# Patient Record
Sex: FEMALE | Race: WHITE | NOT HISPANIC OR LATINO | URBAN - METROPOLITAN AREA
[De-identification: names, ages, dates, MRNs, and addresses within clinical notes are randomized per-mention and may not be internally consistent; named-entity substitution may affect disease eponyms.]

---

## 2017-05-19 RX ORDER — CYCLOPENTOLATE HYDROCHLORIDE 10 MG/ML
1 SOLUTION/ DROPS OPHTHALMIC
Qty: 0 | Refills: 0 | Status: COMPLETED | OUTPATIENT
Start: 2017-05-22 | End: 2017-05-22

## 2017-05-19 RX ORDER — OFLOXACIN 0.3 %
1 DROPS OPHTHALMIC (EYE)
Qty: 0 | Refills: 0 | Status: COMPLETED | OUTPATIENT
Start: 2017-05-22 | End: 2017-05-22

## 2017-05-19 RX ORDER — TROPICAMIDE 1 %
1 DROPS OPHTHALMIC (EYE)
Qty: 0 | Refills: 0 | Status: COMPLETED | OUTPATIENT
Start: 2017-05-22 | End: 2017-05-22

## 2017-05-19 RX ORDER — PHENYLEPHRINE HCL 2.5 %
1 DROPS OPHTHALMIC (EYE)
Qty: 0 | Refills: 0 | Status: COMPLETED | OUTPATIENT
Start: 2017-05-22 | End: 2017-05-22

## 2017-05-22 ENCOUNTER — OUTPATIENT (OUTPATIENT)
Dept: OUTPATIENT SERVICES | Facility: HOSPITAL | Age: 82
LOS: 1 days | Discharge: HOME | End: 2017-05-22

## 2017-05-22 VITALS
DIASTOLIC BLOOD PRESSURE: 67 MMHG | HEART RATE: 65 BPM | OXYGEN SATURATION: 100 % | SYSTOLIC BLOOD PRESSURE: 144 MMHG | HEIGHT: 69 IN | TEMPERATURE: 98 F | WEIGHT: 122.36 LBS | RESPIRATION RATE: 16 BRPM

## 2017-05-22 VITALS
TEMPERATURE: 97 F | OXYGEN SATURATION: 98 % | DIASTOLIC BLOOD PRESSURE: 49 MMHG | RESPIRATION RATE: 16 BRPM | SYSTOLIC BLOOD PRESSURE: 111 MMHG | HEART RATE: 75 BPM

## 2017-05-22 DIAGNOSIS — H25.12 AGE-RELATED NUCLEAR CATARACT, LEFT EYE: ICD-10-CM

## 2017-05-22 DIAGNOSIS — Z96.653 PRESENCE OF ARTIFICIAL KNEE JOINT, BILATERAL: Chronic | ICD-10-CM

## 2017-05-22 DIAGNOSIS — Z98.890 OTHER SPECIFIED POSTPROCEDURAL STATES: Chronic | ICD-10-CM

## 2017-05-22 RX ORDER — MULTIVIT-MIN/FERROUS GLUCONATE 9 MG/15 ML
1 LIQUID (ML) ORAL
Qty: 0 | Refills: 0 | COMMUNITY

## 2017-05-22 RX ORDER — ACETAMINOPHEN 500 MG
650 TABLET ORAL EVERY 6 HOURS
Qty: 0 | Refills: 0 | Status: DISCONTINUED | OUTPATIENT
Start: 2017-05-22 | End: 2017-06-06

## 2017-05-22 RX ORDER — SIMVASTATIN 20 MG/1
1 TABLET, FILM COATED ORAL
Qty: 0 | Refills: 0 | COMMUNITY

## 2017-05-22 RX ORDER — ACETAMINOPHEN 500 MG
1 TABLET ORAL
Qty: 0 | Refills: 0 | COMMUNITY

## 2017-05-22 RX ADMIN — Medication 1 DROP(S): at 12:57

## 2017-05-22 RX ADMIN — Medication 1 DROP(S): at 13:05

## 2017-05-22 RX ADMIN — Medication 1 DROP(S): at 13:01

## 2017-05-22 RX ADMIN — Medication 1 DROP(S): at 12:58

## 2017-05-22 RX ADMIN — Medication 1 DROP(S): at 13:02

## 2017-05-22 RX ADMIN — CYCLOPENTOLATE HYDROCHLORIDE 1 DROP(S): 10 SOLUTION/ DROPS OPHTHALMIC at 13:01

## 2017-05-22 RX ADMIN — Medication 1 DROP(S): at 13:06

## 2017-05-22 RX ADMIN — CYCLOPENTOLATE HYDROCHLORIDE 1 DROP(S): 10 SOLUTION/ DROPS OPHTHALMIC at 12:58

## 2017-05-22 RX ADMIN — CYCLOPENTOLATE HYDROCHLORIDE 1 DROP(S): 10 SOLUTION/ DROPS OPHTHALMIC at 13:06

## 2017-05-22 NOTE — BRIEF OPERATIVE NOTE - PROCEDURE
Cataract extract, extracaps, phacoemuls, w/ prosth lens insertn, 1 stage  05/22/2017    Active  KRISTOPHER

## 2017-05-22 NOTE — ASU DISCHARGE PLAN (ADULT/PEDIATRIC). - MEDICATION SUMMARY - MEDICATIONS TO TAKE
I will START or STAY ON the medications listed below when I get home from the hospital:    Tylenol 325 mg oral capsule  -- 1 cap(s) by mouth 3 times a day, As Needed  -- Indication: For pmd    simvastatin 10 mg oral tablet  -- 1 tab(s) by mouth once a day (at bedtime)  -- Indication: For pmd    PreserVision oral capsule  -- 1 cap(s) by mouth 2 times a day  -- Indication: For pmd    Centrum Adults oral tablet  -- 1 tab(s) by mouth once a day  -- Indication: For pmd    Citracal Calcium + D Slow Release 1200 oral tablet, extended release  -- 2 tab(s) by mouth once a day (in the morning)  -- Indication: For pmd

## 2017-05-25 DIAGNOSIS — Z96.653 PRESENCE OF ARTIFICIAL KNEE JOINT, BILATERAL: ICD-10-CM

## 2017-05-25 DIAGNOSIS — H25.12 AGE-RELATED NUCLEAR CATARACT, LEFT EYE: ICD-10-CM

## 2017-05-25 DIAGNOSIS — Z88.1 ALLERGY STATUS TO OTHER ANTIBIOTIC AGENTS STATUS: ICD-10-CM

## 2017-05-25 DIAGNOSIS — Z87.891 PERSONAL HISTORY OF NICOTINE DEPENDENCE: ICD-10-CM

## 2017-06-15 ENCOUNTER — APPOINTMENT (OUTPATIENT)
Dept: RHEUMATOLOGY | Facility: CLINIC | Age: 82
End: 2017-06-15

## 2017-06-15 VITALS
WEIGHT: 125 LBS | OXYGEN SATURATION: 96 % | HEART RATE: 82 BPM | TEMPERATURE: 98.7 F | BODY MASS INDEX: 24.54 KG/M2 | DIASTOLIC BLOOD PRESSURE: 70 MMHG | HEIGHT: 60 IN | SYSTOLIC BLOOD PRESSURE: 132 MMHG

## 2017-06-15 PROBLEM — K80.20 CALCULUS OF GALLBLADDER WITHOUT CHOLECYSTITIS WITHOUT OBSTRUCTION: Chronic | Status: ACTIVE | Noted: 2017-05-22

## 2017-06-15 PROBLEM — E78.5 HYPERLIPIDEMIA, UNSPECIFIED: Chronic | Status: ACTIVE | Noted: 2017-05-22

## 2017-06-16 ENCOUNTER — MED ADMIN CHARGE (OUTPATIENT)
Age: 82
End: 2017-06-16

## 2017-06-16 RX ADMIN — DENOSUMAB 0 MG/ML: 60 INJECTION SUBCUTANEOUS at 00:00

## 2017-06-17 RX ORDER — CALCIUM CITRATE/VITAMIN D3 200MG-6.25
TABLET ORAL
Qty: 7 | Refills: 0 | Status: ACTIVE | COMMUNITY
Start: 2017-06-17

## 2017-09-25 ENCOUNTER — APPOINTMENT (OUTPATIENT)
Dept: DERMATOLOGY | Facility: CLINIC | Age: 82
End: 2017-09-25
Payer: MEDICARE

## 2017-09-25 DIAGNOSIS — L82.1 OTHER SEBORRHEIC KERATOSIS: ICD-10-CM

## 2017-09-25 PROCEDURE — 99213 OFFICE O/P EST LOW 20 MIN: CPT | Mod: 25

## 2017-09-25 PROCEDURE — 17000 DESTRUCT PREMALG LESION: CPT

## 2017-12-08 ENCOUNTER — OTHER (OUTPATIENT)
Age: 82
End: 2017-12-08

## 2017-12-08 RX ORDER — DENOSUMAB 60 MG/ML
60 INJECTION SUBCUTANEOUS
Qty: 1 | Refills: 0 | Status: COMPLETED | OUTPATIENT
Start: 2017-06-16

## 2017-12-14 ENCOUNTER — APPOINTMENT (OUTPATIENT)
Dept: RHEUMATOLOGY | Facility: CLINIC | Age: 82
End: 2017-12-14

## 2017-12-19 ENCOUNTER — APPOINTMENT (OUTPATIENT)
Dept: RHEUMATOLOGY | Facility: CLINIC | Age: 82
End: 2017-12-19
Payer: MEDICARE

## 2017-12-19 VITALS
HEIGHT: 60 IN | WEIGHT: 125 LBS | HEART RATE: 77 BPM | BODY MASS INDEX: 24.54 KG/M2 | OXYGEN SATURATION: 97 % | SYSTOLIC BLOOD PRESSURE: 124 MMHG | DIASTOLIC BLOOD PRESSURE: 68 MMHG

## 2017-12-19 PROCEDURE — 96372 THER/PROPH/DIAG INJ SC/IM: CPT

## 2017-12-19 RX ORDER — DENOSUMAB 60 MG/ML
60 INJECTION SUBCUTANEOUS
Qty: 0 | Refills: 0 | Status: COMPLETED | OUTPATIENT
Start: 2017-12-19

## 2017-12-19 RX ADMIN — DENOSUMAB 0 MG/ML: 60 INJECTION SUBCUTANEOUS at 00:00

## 2018-05-02 NOTE — ASU PATIENT PROFILE, ADULT - AS SC BRADEN FRICTION
----- Message from Jen Pop MD sent at 5/2/2018  9:58 AM CDT -----  Mild esophagitis. No evidence of Diaz's. EGD in 5 years.  
Left message for patient to call Dr. Pop's office.   
Patient was informed and verbalized understanding.   
(3) no apparent problem

## 2018-06-28 ENCOUNTER — APPOINTMENT (OUTPATIENT)
Dept: RHEUMATOLOGY | Facility: CLINIC | Age: 83
End: 2018-06-28
Payer: MEDICARE

## 2018-06-28 VITALS
SYSTOLIC BLOOD PRESSURE: 100 MMHG | DIASTOLIC BLOOD PRESSURE: 66 MMHG | HEIGHT: 60 IN | OXYGEN SATURATION: 94 % | BODY MASS INDEX: 25.32 KG/M2 | HEART RATE: 82 BPM | WEIGHT: 129 LBS

## 2018-06-28 PROCEDURE — 99213 OFFICE O/P EST LOW 20 MIN: CPT | Mod: 25

## 2018-06-28 PROCEDURE — 96372 THER/PROPH/DIAG INJ SC/IM: CPT

## 2018-06-28 RX ORDER — DENOSUMAB 60 MG/ML
60 INJECTION SUBCUTANEOUS
Qty: 0 | Refills: 0 | Status: COMPLETED | OUTPATIENT
Start: 2018-06-28

## 2018-06-28 RX ADMIN — DENOSUMAB 0 MG/ML: 60 INJECTION SUBCUTANEOUS at 00:00

## 2019-01-07 ENCOUNTER — APPOINTMENT (OUTPATIENT)
Dept: RHEUMATOLOGY | Facility: CLINIC | Age: 84
End: 2019-01-07

## 2019-01-10 ENCOUNTER — APPOINTMENT (OUTPATIENT)
Dept: RHEUMATOLOGY | Facility: CLINIC | Age: 84
End: 2019-01-10
Payer: MEDICARE

## 2019-01-10 VITALS
DIASTOLIC BLOOD PRESSURE: 70 MMHG | HEART RATE: 97 BPM | OXYGEN SATURATION: 94 % | HEIGHT: 60 IN | WEIGHT: 117 LBS | BODY MASS INDEX: 22.97 KG/M2 | SYSTOLIC BLOOD PRESSURE: 110 MMHG

## 2019-01-10 DIAGNOSIS — G57.02 LESION OF SCIATIC NERVE, LEFT LOWER LIMB: ICD-10-CM

## 2019-01-10 PROCEDURE — 96372 THER/PROPH/DIAG INJ SC/IM: CPT

## 2019-01-10 PROCEDURE — 99213 OFFICE O/P EST LOW 20 MIN: CPT | Mod: 25

## 2019-01-10 RX ORDER — DENOSUMAB 60 MG/ML
60 INJECTION SUBCUTANEOUS
Qty: 1 | Refills: 0 | Status: COMPLETED | OUTPATIENT
Start: 2019-01-10

## 2019-01-10 RX ADMIN — DENOSUMAB 0 MG/ML: 60 INJECTION SUBCUTANEOUS at 00:00

## 2019-01-10 NOTE — REASON FOR VISIT
[Procedure: _________] : a [unfilled] procedure visit [FreeTextEntry1] : Prolia and c/o L mid buttock pain x 2 ys

## 2019-01-10 NOTE — ASSESSMENT
[FreeTextEntry1] : CC:  L mid buttock pain x 2 ys, progressively worse, non radiating most time, occas does radiate to back thigh... no weakness but pain significant w/ activity, can't walk more than 5-10 mins, better w/ rest. NO imaging, seen by ortho suggested MRI > 6 m ago, fear and husbands illness/ death prevented.  No pt\par \par OP:  tolerating Prolia w/out issues q 6 m updated study needed \par NOTE: \par - poor tolerance to oral BP in past - severe GI distress \par \par P/E:  point tenderness L mid buttock area w/ neg SLR, nl str\par A:  likely piriformis tendinopathy or radiculopathy from LS spine given possible description of SS- worse w/ walking \par P:\par - start PT, massage. Call if sx worsen\par - would then recommend imaging and possible injection tx\par - updated Dxa now may be able to switch to REclast

## 2019-01-10 NOTE — PROCEDURE
[Today's Date:] : Date: [unfilled] [Risks] : risks [Benefits] : benefits [Alternatives] : alternatives [Consent Obtained] : written consent was obtained prior to the procedure and is detailed in the patient's record [Patient] : Prior to the start of the procedure a time out was taken and the identity of the patient was confirmed via name and date of birth with the patient. The correct site and the procedure to be performed were confirmed. The correct side was confirmed if applicable. The availability of the correct equipment was verified [Therapeutic] : therapeutic [#1 Site: ______] : #1 site identified in the [unfilled] [Alcohol] : alcohol [de-identified] : Prolia

## 2019-07-19 ENCOUNTER — APPOINTMENT (OUTPATIENT)
Dept: RHEUMATOLOGY | Facility: CLINIC | Age: 84
End: 2019-07-19
Payer: MEDICARE

## 2019-07-19 VITALS
HEIGHT: 60 IN | BODY MASS INDEX: 22.78 KG/M2 | OXYGEN SATURATION: 98 % | SYSTOLIC BLOOD PRESSURE: 136 MMHG | HEART RATE: 78 BPM | WEIGHT: 116 LBS | DIASTOLIC BLOOD PRESSURE: 64 MMHG | RESPIRATION RATE: 16 BRPM

## 2019-07-19 PROCEDURE — 99213 OFFICE O/P EST LOW 20 MIN: CPT | Mod: 25

## 2019-07-19 PROCEDURE — 96372 THER/PROPH/DIAG INJ SC/IM: CPT

## 2019-07-20 ENCOUNTER — MED ADMIN CHARGE (OUTPATIENT)
Age: 84
End: 2019-07-20

## 2019-07-20 RX ADMIN — DENOSUMAB 0 MG/ML: 60 INJECTION SUBCUTANEOUS at 00:00

## 2019-07-20 NOTE — PHYSICAL EXAM
[General Appearance - Alert] : alert [General Appearance - In No Acute Distress] : in no acute distress [General Appearance - Well Nourished] : well nourished [General Appearance - Well Developed] : well developed [General Appearance - Well-Appearing] : healthy appearing [No CVA Tenderness] : no ~M costovertebral angle tenderness [No Spinal Tenderness] : no spinal tenderness [FreeTextEntry1] : fair turgor [Over the Past 2 Weeks, Have You Felt Down, Depressed, or Hopeless?] : 1.) Over the past 2 weeks, have you felt down, depressed, or hopeless? No [Over the Past 2 Weeks, Have You Felt Little Interest or Pleasure Doing Things?] : 2.) Over the past 2 weeks, have you felt little interest or pleasure doing things? No

## 2019-07-20 NOTE — HISTORY OF PRESENT ILLNESS
[FreeTextEntry1] : 7/19/19\par - stable over past 6 months, last Prolia 1/10/19 well tolerated no infections, SE or toxicities\par - No recent fractures, wt stable, not exercising routinely \par - life very stressful...  just under 1 yr, recently son dx w/ pancreatic cancer, multiple family stressors... does have lot family support\par - updated Dexa suggests improvement / stability but still high risk for fractures\par \par 1) OP\par 2) OA\par _________________________________________________________________________\par \par \par (Initial HPI 6/15/17) \par Had been longstanding pt of Dr. Lugo, followed for OA and Osteoporosis, not seen since 2005.  Most recent Dexa suggests persistent OP and need for tx.  \par Also c/o back pain and frustration w/ activity intolerance\par \par 1) Osteoporosis:  Last DXA 9/12/16 w/ T score -2.6 at [facility]  most severe w/ 11% drop since 2009 study and Frax 20% overall and 7.4% risk hip fx.  VFA not done, extensive DJD throughout spine\par -Exercises routinely, adequate (too high) Ca/ Vit d intake (need to see most recent levels, excellent well balanced diet w/ full dairy intake likely > 600-800 mg ca in diet alone \par - reportedly nl) no secondary conditions contributing to significant loss .  \par  + Risk factors:  small stature w/ BMI 24, one glass wine daily\par No previous fragility fractures, no smoking, thyroid dysfunction, parathyroid disease, longstanding anti sz, PPI, SSRI,  FH, or secondary risks such as malignancy/ chemo, celiac dz \par No hx renal calculi or radiation therapy\par Treatment:  \par - last Prolia 10/16 (1st dose)\par - Actonel ys ago severe esophagitis, diarrhea \par \par 2) OA multisite: \par Hands: classic OA changes w/ CMC squaring and intermittent discomfort tolerable \par LS:  w/ confirmed SS w/ pain worse w/ standing, feels better pushing cart- tolerates golf w/ minimal discomfort and intermittent radicular/ facet arthropathy w/ pain localized, stiffness < 10 mins radiating at times into L buttock- sometimes down to L foot.  chronic, at times significant but responds to APAP, nothing stronger.  No previous interventional procedures or recent PT.   Golfs routinely

## 2019-07-20 NOTE — REASON FOR VISIT
[Procedure: _________] : a [unfilled] procedure visit [Follow-Up: _____] : a [unfilled] follow-up visit [FreeTextEntry1] : OA, Osteoporosis

## 2019-07-20 NOTE — ASSESSMENT
[FreeTextEntry1] : 88 yo wf w/ OP and diffuse OA, multisite, hypothyroidism, HLD, \par \par 1) Osteoporosis:  Updated DExa 6/20/19 w/ most severe T score -2.1 and nearly 8% improvement over past study 2016 but Frax remains high with risk for hip fx at 4.7 %... given that she lives alone and has small stature, with advanced OA... still safest to continue Prolia.  \par Was also too stressed about  multiple family issues to make any change at this time.  Will continue Prolia x 2 ys then likely switch to Reclast depending on risk assessment at that time. \par  VFA not done- again unable to perform as no nl vertebra available for comparison. \par -Exercises routinely- in past but past few months struggling 2/2 husbands death and family issues, not consistent  \par Continues w/  adequate  Ca/ Vit d intake updated labs needed now \par  + Risk factors:  small stature w/ BMI 24, one glass wine daily\par No previous fragility fractures,\par No hx renal calculi or radiation therapy\par Treatment:  \par - Prolia 12/17(1st dose)\par \par 2) OA multisite: stable, not active pain at this time.  Overall despite stress doing well\par Hands: classic OA changes w/ CMC squaring and intermittent discomfort tolerable \par LS:  w/ confirmed SS w/ pain worse w/ standing, feels better pushing cart- tolerates golf w/ minimal discomfort and intermittent radicular/ facet arthropathy w/ pain localized, stiffness < 10 mins radiating at times into L buttock- sometimes down to L foot.  chronic, at times significant but responds to APAP, nothing stronger.  No previous interventional procedures or recent PT.  Had been golfing routinely\par Discussion: \par Classic sx of SS tolerable at this point but would benefit from core strengthening to minimize impact and need for pharmacotherapy understood, but given current stressors, nothing formal. .\par \par 3) Stress: multiple issues, tolerated at this point with no complications.  Working closlely with family \par \par Plan:\par - #4 Prolia given today,will continue for now given degree of stress and inability to consider a change at this point.  RIsk for fracture remains high so Prolia remains appropriate.\par \par - Calcium and vit D:  needs updated measure w/ next labs.  Full metabolic w/u should be considered (PTH, SPEP as well- if minimal improvement over next study would suggest 24 hr urine for Ca)\par Ideally 30-45 mins moderate-intense wt bearing/ cardiovascular and strength training for optimal bone health \par \par Taking 3997-2943 mg Ca / 2000 IU vit daily between diet and supplements. Higher doses can cause kidney stones and contribut to atherosclerosis. The average diet contains 600-800 mg Calcium. \par \par - Vit D with goal mnt Vit D between 30-50 for optimal bone health usually requiring 5374-9313 IU daily supplement (hard to get enough in diet). \par Need 15-20 mins sunshine, unprotected daily for optimal Vit d from sun... \par \par - rto 6 months, call ahead to be sure PA done for Prolia.

## 2019-07-20 NOTE — REVIEW OF SYSTEMS
[Arthralgias] : arthralgias [As Noted in HPI] : as noted in HPI [Negative] : Heme/Lymph [FreeTextEntry9] : tolerable [de-identified] : situational stress r/t recent  and family issues  [de-identified] : updated labs needed? current vit D level last measrue 8/18 at 36

## 2019-07-20 NOTE — ASSESSMENT
[FreeTextEntry1] : 86 yo wf w/ OP and diffuse OA, multisite, hypothyroidism, HLD, \par \par 1) Osteoporosis:  Updated DExa 6/20/19 w/ most severe T score -2.1 and nearly 8% improvement over past study 2016 but Frax remains high with risk for hip fx at 4.7 %... given that she lives alone and has small stature, with advanced OA... still safest to continue Prolia.  \par Was also too stressed about  multiple family issues to make any change at this time.  Will continue Prolia x 2 ys then likely switch to Reclast depending on risk assessment at that time. \par  VFA not done- again unable to perform as no nl vertebra available for comparison. \par -Exercises routinely- in past but past few months struggling 2/2 husbands death and family issues, not consistent  \par Continues w/  adequate  Ca/ Vit d intake updated labs needed now \par  + Risk factors:  small stature w/ BMI 24, one glass wine daily\par No previous fragility fractures,\par No hx renal calculi or radiation therapy\par Treatment:  \par - Prolia 12/17(1st dose)\par \par 2) OA multisite: stable, not active pain at this time.  Overall despite stress doing well\par Hands: classic OA changes w/ CMC squaring and intermittent discomfort tolerable \par LS:  w/ confirmed SS w/ pain worse w/ standing, feels better pushing cart- tolerates golf w/ minimal discomfort and intermittent radicular/ facet arthropathy w/ pain localized, stiffness < 10 mins radiating at times into L buttock- sometimes down to L foot.  chronic, at times significant but responds to APAP, nothing stronger.  No previous interventional procedures or recent PT.  Had been golfing routinely\par Discussion: \par Classic sx of SS tolerable at this point but would benefit from core strengthening to minimize impact and need for pharmacotherapy understood, but given current stressors, nothing formal. .\par \par 3) Stress: multiple issues, tolerated at this point with no complications.  Working closlely with family \par \par Plan:\par - #4 Prolia given today,will continue for now given degree of stress and inability to consider a change at this point.  RIsk for fracture remains high so Prolia remains appropriate.\par \par - Calcium and vit D:  needs updated measure w/ next labs.  Full metabolic w/u should be considered (PTH, SPEP as well- if minimal improvement over next study would suggest 24 hr urine for Ca)\par Ideally 30-45 mins moderate-intense wt bearing/ cardiovascular and strength training for optimal bone health \par \par Taking 2684-7457 mg Ca / 2000 IU vit daily between diet and supplements. Higher doses can cause kidney stones and contribut to atherosclerosis. The average diet contains 600-800 mg Calcium. \par \par - Vit D with goal mnt Vit D between 30-50 for optimal bone health usually requiring 3362-6604 IU daily supplement (hard to get enough in diet). \par Need 15-20 mins sunshine, unprotected daily for optimal Vit d from sun... \par \par - rto 6 months, call ahead to be sure PA done for Prolia.

## 2019-07-20 NOTE — PROCEDURE
[Today's Date:] : Date: [unfilled] [Risks] : risks [Benefits] : benefits [Alternatives] : alternatives [Consent Obtained] : written consent was obtained prior to the procedure and is detailed in the patient's record [Patient] : Prior to the start of the procedure a time out was taken and the identity of the patient was confirmed via name and date of birth with the patient. The correct site and the procedure to be performed were confirmed. The correct side was confirmed if applicable. The availability of the correct equipment was verified [#1 Site: ______] : #1 site identified in the [unfilled] [Alcohol] : alcohol [Tolerated Well] : the patient tolerated the procedure well [No Complications] : there were no complications [Instructions Given] : handouts/patient instructions were given to patient [Patient Instructed to Call] : patient was instructed to call if redness at site, a decrease in range of motion or an increase in pain is noted after procedure. [de-identified] : Prolia #4  injection-  [de-identified] : APAP for discomfort, call if any issues  [FreeTextEntry1] : monitor for flulike sx or allergic rxn, take APAP as needed for first 3 days, if flulike sx persist call office\par \par \par

## 2019-07-20 NOTE — REVIEW OF SYSTEMS
[Arthralgias] : arthralgias [As Noted in HPI] : as noted in HPI [Negative] : Heme/Lymph [FreeTextEntry9] : tolerable [de-identified] : situational stress r/t recent  and family issues  [de-identified] : updated labs needed? current vit D level last measrue 8/18 at 36

## 2019-07-20 NOTE — PROCEDURE
[Today's Date:] : Date: [unfilled] [Risks] : risks [Benefits] : benefits [Alternatives] : alternatives [Consent Obtained] : written consent was obtained prior to the procedure and is detailed in the patient's record [Patient] : Prior to the start of the procedure a time out was taken and the identity of the patient was confirmed via name and date of birth with the patient. The correct site and the procedure to be performed were confirmed. The correct side was confirmed if applicable. The availability of the correct equipment was verified [#1 Site: ______] : #1 site identified in the [unfilled] [Alcohol] : alcohol [Tolerated Well] : the patient tolerated the procedure well [No Complications] : there were no complications [Instructions Given] : handouts/patient instructions were given to patient [Patient Instructed to Call] : patient was instructed to call if redness at site, a decrease in range of motion or an increase in pain is noted after procedure. [de-identified] : Prolia #4  injection-  [de-identified] : APAP for discomfort, call if any issues  [FreeTextEntry1] : monitor for flulike sx or allergic rxn, take APAP as needed for first 3 days, if flulike sx persist call office\par \par \par

## 2019-07-23 NOTE — PHYSICAL EXAM
[No CVA Tenderness] : no ~M costovertebral angle tenderness [No Spinal Tenderness] : no spinal tenderness [General Appearance - Alert] : alert [General Appearance - In No Acute Distress] : in no acute distress [General Appearance - Well Nourished] : well nourished [General Appearance - Well Developed] : well developed [General Appearance - Well-Appearing] : healthy appearing [Over the Past 2 Weeks, Have You Felt Down, Depressed, or Hopeless?] : 1.) Over the past 2 weeks, have you felt down, depressed, or hopeless? No [FreeTextEntry1] : fair turgor [Over the Past 2 Weeks, Have You Felt Little Interest or Pleasure Doing Things?] : 2.) Over the past 2 weeks, have you felt little interest or pleasure doing things? No

## 2019-07-23 NOTE — ASSESSMENT
[FreeTextEntry1] : 88 yo wf w/ OP and diffuse OA, multisite, hypothyroidism, HLD, \par \par 1) Osteoporosis:  Updated DExa 6/20/19 w/ most severe T score -2.1 and nearly 8% improvement over past study 2016 but Frax remains high with risk for hip fx at 4.7 %... given that she lives alone and has small stature, with advanced OA... still safest to continue Prolia.  \par Was also too stressed about  multiple family issues to make any change at this time.  Will continue Prolia x 2 ys then likely switch to Reclast depending on risk assessment at that time. \par  VFA not done- again unable to perform as no nl vertebra available for comparison. \par -Exercises routinely- in past but past few months struggling 2/2 husbands death and family issues, not consistent  \par Continues w/  adequate  Ca/ Vit d intake updated labs needed now \par  + Risk factors:  small stature w/ BMI 24, one glass wine daily\par No previous fragility fractures,\par No hx renal calculi or radiation therapy\par Treatment:  \par - Prolia 12/17(1st dose)\par \par 2) OA multisite: stable, not active pain at this time.  Overall despite stress doing well\par Hands: classic OA changes w/ CMC squaring and intermittent discomfort tolerable \par LS:  w/ confirmed SS w/ pain worse w/ standing, feels better pushing cart- tolerates golf w/ minimal discomfort and intermittent radicular/ facet arthropathy w/ pain localized, stiffness < 10 mins radiating at times into L buttock- sometimes down to L foot.  chronic, at times significant but responds to APAP, nothing stronger.  No previous interventional procedures or recent PT.  Had been golfing routinely\par Discussion: \par Classic sx of SS tolerable at this point but would benefit from core strengthening to minimize impact and need for pharmacotherapy understood, but given current stressors, nothing formal. .\par \par 3) Stress: multiple issues, tolerated at this point with no complications.  Working closlely with family \par \par Plan:\par - #4 Prolia given today\par \par - Calcium and vit D:  needs updated measure w/ next labs.  Full metabolic w/u should be considered (PTH, SPEP as well- if minimal improvement over next study would suggest 24 hr urine for Ca)\par Ideally 30-45 mins moderate-intense wt bearing/ cardiovascular and strength training for optimal bone health \par \par Taking 2795-7362 mg Ca / 2000 IU vit daily between diet and supplements. Higher doses can cause kidney stones and contribut to atherosclerosis. The average diet contains 600-800 mg Calcium. \par \par - Vit D with goal mnt Vit D between 30-50 for optimal bone health usually requiring 3983-0254 IU daily supplement (hard to get enough in diet). \par Need 15-20 mins sunshine, unprotected daily for optimal Vit d from sun... \par \par - rto 6 months, call ahead to be sure PA done for Prolia.

## 2019-07-23 NOTE — PROCEDURE
[Today's Date:] : Date: [unfilled] [Risks] : risks [Benefits] : benefits [Alternatives] : alternatives [Consent Obtained] : written consent was obtained prior to the procedure and is detailed in the patient's record [Patient] : Prior to the start of the procedure a time out was taken and the identity of the patient was confirmed via name and date of birth with the patient. The correct site and the procedure to be performed were confirmed. The correct side was confirmed if applicable. The availability of the correct equipment was verified [#1 Site: ______] : #1 site identified in the [unfilled] [Alcohol] : alcohol [Tolerated Well] : the patient tolerated the procedure well [No Complications] : there were no complications [Instructions Given] : handouts/patient instructions were given to patient [Patient Instructed to Call] : patient was instructed to call if redness at site, a decrease in range of motion or an increase in pain is noted after procedure. [de-identified] : Prolia #4  injection-  [FreeTextEntry1] : monitor for flulike sx or allergic rxn, take APAP as needed for first 3 days, if flulike sx persist call office\par \par \par  [de-identified] : APAP for discomfort, call if any issues

## 2019-07-23 NOTE — REVIEW OF SYSTEMS
[Arthralgias] : arthralgias [As Noted in HPI] : as noted in HPI [Negative] : Heme/Lymph [FreeTextEntry9] : tolerable [de-identified] : situational stress r/t recent  and family issues  [de-identified] : updated labs needed? current vit D level last measrue 8/18 at 36

## 2019-12-17 NOTE — PHYSICAL EXAM
[FreeTextEntry3] : Keratotic papule, anterior aspect of the nose, just inferior to the nasal bridge.

## 2019-12-17 NOTE — ASSESSMENT
[FreeTextEntry1] : r/o AK vs. SCC on the nose.\par tx'ed with light curattage.\par Dressed.\par \par Will plan IPL for the face, on the face.

## 2020-01-06 LAB — CORE LAB BIOPSY: NORMAL

## 2020-01-09 ENCOUNTER — APPOINTMENT (OUTPATIENT)
Dept: RHEUMATOLOGY | Facility: CLINIC | Age: 85
End: 2020-01-09
Payer: MEDICARE

## 2020-01-09 VITALS
WEIGHT: 121 LBS | DIASTOLIC BLOOD PRESSURE: 65 MMHG | BODY MASS INDEX: 23.63 KG/M2 | HEART RATE: 85 BPM | OXYGEN SATURATION: 98 % | SYSTOLIC BLOOD PRESSURE: 135 MMHG | TEMPERATURE: 97.9 F

## 2020-01-09 PROCEDURE — 99213 OFFICE O/P EST LOW 20 MIN: CPT

## 2020-01-10 NOTE — REVIEW OF SYSTEMS
[Arthralgias] : arthralgias [As Noted in HPI] : as noted in HPI [Negative] : Heme/Lymph [de-identified] : updated labs needed? current vit D level last measrue 8/18 at 36  [de-identified] : situational stress r/t recent  and family issues  [FreeTextEntry9] : tolerable but also describing neurogenic claucidation.. limits activity

## 2020-01-10 NOTE — PHYSICAL EXAM
[General Appearance - Alert] : alert [General Appearance - In No Acute Distress] : in no acute distress [General Appearance - Well Nourished] : well nourished [General Appearance - Well Developed] : well developed [General Appearance - Well-Appearing] : healthy appearing [No CVA Tenderness] : no ~M costovertebral angle tenderness [No Spinal Tenderness] : no spinal tenderness [] : no respiratory distress [Heart Rate And Rhythm] : heart rate was normal and rhythm regular [Auscultation Breath Sounds / Voice Sounds] : lungs were clear to auscultation bilaterally [Murmurs] : no murmurs [Heart Sounds] : normal S1 and S2 [Heart Sounds Gallop] : no gallops [Heart Sounds Pericardial Friction Rub] : no pericardial rub [Edema] : there was no peripheral edema [FreeTextEntry1] : diffuse paraspinal muscle pain throughout lumbar region- mild [Oriented To Time, Place, And Person] : oriented to person, place, and time [No Focal Deficits] : no focal deficits [Motor Exam] : the motor exam was normal [Affect] : the affect was normal [Impaired Insight] : insight and judgment were intact [Over the Past 2 Weeks, Have You Felt Down, Depressed, or Hopeless?] : 1.) Over the past 2 weeks, have you felt down, depressed, or hopeless? No [Mood] : the mood was normal [Over the Past 2 Weeks, Have You Felt Little Interest or Pleasure Doing Things?] : 2.) Over the past 2 weeks, have you felt little interest or pleasure doing things? No

## 2020-01-10 NOTE — ASSESSMENT
[FreeTextEntry1] : 86 yo wf w/ OP and diffuse OA, multisite, hypothyroidism, HLD, \par \par 1) Osteoporosis:  Updated DExa 6/20/19 w/ most severe T score -2.1 and nearly 8% improvement over past study 2016 but Frax remains high with risk for hip fx at 4.7 %... given that she lives alone and has small stature, with advanced OA... still safest to continue Prolia.  \par Continues to be  under tremendous stress about  multiple family issues to make any change at this time.  Will continue Prolia x 2 ys then likely switch to Reclast depending on risk assessment at that time. \par  VFA not done- again unable to perform as no nl vertebra available for comparison. \par -Exercises routinely- in past but past few months struggling 2/2 husbands death and family issues, not consistent  \par Continues w/  adequate  Ca/ Vit d intake updated labs needed now \par  + Risk factors:  small stature w/ BMI 24, one glass wine daily\par No previous fragility fractures,\par No hx renal calculi or radiation therapy\par Treatment:  \par - Prolia 12/17(1st dose)\par \par 2) OA multisite: stable, not active pain at this time except as noted.  Overall despite stress doing well\par Hands: classic OA changes w/ CMC squaring and intermittent discomfort tolerable \par LS:  w/ confirmed SS w/ pain worse w/ standing, feels better pushing cart- tolerates golf w/ minimal discomfort and intermittent radicular/ facet arthropathy w/ pain localized, stiffness < 10 mins radiating at times into L buttock- sometimes down to L foot.  chronic, at times significant but responds to APAP, nothing stronger.  No previous interventional procedures or recent PT.  Recent increase in sx but no updated imaging... may consider open MRI in future but declined at this time. No overt weakness.. or bowel/ bladder incontinence, advised to call immediately if develops\par Discussion: \par Classic sx of SS tolerable at this point but would benefit from core strengthening to minimize impact and need for pharmacotherapy understood, but given current stressors, nothing formal. .\par \par 3) Stress: multiple issues, tolerated at this point with no complications.  Working closlely with family \par \par Plan:\par - #4 Prolia given 7/19/19... can't give next dose now - 1 wk early, not covered by insurance. Will be traveling next wk and will have to wait 2+ months... advised to mnt wt bearing activity and get updated labs to insure adequate Ca/ vit D levels.  Call if any new fx \par \par - Calcium and vit D:  needs updated measure w/ next labs.  Full metabolic w/u should be considered (PTH, SPEP as well- if minimal improvement over next study would suggest 24 hr urine for Ca)\par Ideally 30-45 mins moderate-intense wt bearing/ cardiovascular and strength training for optimal bone health \par \par Taking 6647-9010 mg Ca / 2000 IU vit daily between diet and supplements. Higher doses can cause kidney stones and contribut to atherosclerosis. The average diet contains 600-800 mg Calcium. \par \par - Vit D with goal mnt Vit D between 30-50 for optimal bone health usually requiring 0059-2932 IU daily supplement (hard to get enough in diet). \par Need 15-20 mins sunshine, unprotected daily for optimal Vit d from sun... \par \par - rto 2 months, call ahead to be sure PA done for Prolia....

## 2020-01-10 NOTE — HISTORY OF PRESENT ILLNESS
[FreeTextEntry1] : 1/9/20\par - stable over past 6 months, last Prolia 7/19/19 well tolerated no infections, SE or toxicities\par - No recent fractures, wt stable, not exercising routinely \par - life still  very stressful...  1 yr and living along- sad,  son dx w/ pancreatic cancer, multiple family stressors... does have lot family support\par - most significant OA c/o low back pain/ leg pain trouble standing for long periods and limited walking 2/2 leg pain... needs MRI LS spine but has been too fearful of MRI\par \par 1) OP\par 2) OA\par _________________________________________________________________________\par \par \par (Initial HPI 6/15/17) \par Had been longstanding pt of Dr. Lugo, followed for OA and Osteoporosis, not seen since 2005.  Most recent Dexa suggests persistent OP and need for tx.  \par Also c/o back pain and frustration w/ activity intolerance\par \par 1) Osteoporosis:  Last DXA 9/12/16 w/ T score -2.6 at LFN  most severe w/ 11% drop since 2009 study and Frax 20% overall and 7.4% risk hip fx.  VFA not done, extensive DJD throughout spine\par -Exercises routinely, adequate (too high) Ca/ Vit d intake (need to see most recent levels, excellent well balanced diet w/ full dairy intake likely > 600-800 mg ca in diet alone \par - reportedly nl) no secondary conditions contributing to significant loss .  \par  + Risk factors:  small stature w/ BMI 24, one glass wine daily\par No previous fragility fractures, no smoking, thyroid dysfunction, parathyroid disease, longstanding anti sz, PPI, SSRI,  FH, or secondary risks such as malignancy/ chemo, celiac dz \par No hx renal calculi or radiation therapy\par Treatment:  \par - last Prolia 10/16 (1st dose)\par - Actonel ys ago severe esophagitis, diarrhea \par \par 2) OA multisite: \par Hands: classic OA changes w/ CMC squaring and intermittent discomfort tolerable \par LS:  w/ confirmed SS w/ pain worse w/ standing, feels better pushing cart- tolerates golf w/ minimal discomfort and intermittent radicular/ facet arthropathy w/ pain localized, stiffness < 10 mins radiating at times into L buttock- sometimes down to L foot.  chronic, at times significant but responds to APAP, nothing stronger.  No previous interventional procedures or recent PT.   Golfs routinely

## 2020-04-16 ENCOUNTER — APPOINTMENT (OUTPATIENT)
Dept: RHEUMATOLOGY | Facility: CLINIC | Age: 85
End: 2020-04-16

## 2020-06-12 ENCOUNTER — APPOINTMENT (OUTPATIENT)
Dept: RHEUMATOLOGY | Facility: CLINIC | Age: 85
End: 2020-06-12
Payer: MEDICARE

## 2020-06-12 VITALS
WEIGHT: 121 LBS | OXYGEN SATURATION: 98 % | SYSTOLIC BLOOD PRESSURE: 120 MMHG | TEMPERATURE: 97.4 F | DIASTOLIC BLOOD PRESSURE: 70 MMHG | BODY MASS INDEX: 23.75 KG/M2 | HEART RATE: 86 BPM | HEIGHT: 60 IN

## 2020-06-12 PROCEDURE — 99213 OFFICE O/P EST LOW 20 MIN: CPT | Mod: 25

## 2020-06-12 PROCEDURE — 96372 THER/PROPH/DIAG INJ SC/IM: CPT

## 2020-06-12 RX ORDER — DENOSUMAB 60 MG/ML
60 INJECTION SUBCUTANEOUS
Qty: 1 | Refills: 0 | Status: COMPLETED | OUTPATIENT
Start: 2020-06-12

## 2020-06-12 RX ORDER — DENOSUMAB 60 MG/ML
60 INJECTION SUBCUTANEOUS
Qty: 1 | Refills: 0 | Status: COMPLETED | OUTPATIENT
Start: 2019-07-20

## 2020-06-12 RX ADMIN — DENOSUMAB 60 MG/ML: 60 INJECTION SUBCUTANEOUS at 00:00

## 2020-06-15 ENCOUNTER — MED ADMIN CHARGE (OUTPATIENT)
Age: 85
End: 2020-06-15

## 2020-06-15 RX ORDER — DENOSUMAB 60 MG/ML
60 INJECTION SUBCUTANEOUS
Qty: 1 | Refills: 0 | Status: COMPLETED | OUTPATIENT
Start: 2020-06-15

## 2020-06-15 RX ADMIN — DENOSUMAB 60 MG/ML: 60 INJECTION SUBCUTANEOUS at 00:00

## 2020-06-15 NOTE — REVIEW OF SYSTEMS
[Arthralgias] : arthralgias [As Noted in HPI] : as noted in HPI [Negative] : Heme/Lymph [de-identified] : situational stress r/t recent  and family issues  [FreeTextEntry9] : tolerable but also describing neurogenic claucidation.. limits activity 20-30 mins walk max  [de-identified] : no obvious weakness  [de-identified] : updated labs needed? current vit D level last measrue 8/18 at 36

## 2020-06-15 NOTE — PHYSICAL EXAM
[General Appearance - Alert] : alert [General Appearance - Well Nourished] : well nourished [General Appearance - Well Developed] : well developed [General Appearance - In No Acute Distress] : in no acute distress [General Appearance - Well-Appearing] : healthy appearing [] : no respiratory distress [Heart Rate And Rhythm] : heart rate was normal and rhythm regular [Heart Sounds] : normal S1 and S2 [Auscultation Breath Sounds / Voice Sounds] : lungs were clear to auscultation bilaterally [Heart Sounds Pericardial Friction Rub] : no pericardial rub [Heart Sounds Gallop] : no gallops [Murmurs] : no murmurs [Edema] : there was no peripheral edema [No CVA Tenderness] : no ~M costovertebral angle tenderness [No Spinal Tenderness] : no spinal tenderness [No Focal Deficits] : no focal deficits [Motor Exam] : the motor exam was normal [Affect] : the affect was normal [Impaired Insight] : insight and judgment were intact [Oriented To Time, Place, And Person] : oriented to person, place, and time [Mood] : the mood was normal [FreeTextEntry1] : 5/5 str throughout [Over the Past 2 Weeks, Have You Felt Down, Depressed, or Hopeless?] : 1.) Over the past 2 weeks, have you felt down, depressed, or hopeless? No [Over the Past 2 Weeks, Have You Felt Little Interest or Pleasure Doing Things?] : 2.) Over the past 2 weeks, have you felt little interest or pleasure doing things? No

## 2020-06-15 NOTE — PROCEDURE
[Today's Date:] : Date: [unfilled] [Risks] : risks [Benefits] : benefits [Consent Obtained] : written consent was obtained prior to the procedure and is detailed in the patient's record [Patient] : Prior to the start of the procedure a time out was taken and the identity of the patient was confirmed via name and date of birth with the patient. The correct site and the procedure to be performed were confirmed. The correct side was confirmed if applicable. The availability of the correct equipment was verified [Therapeutic] : therapeutic [#1 Site: ______] : #1 site identified in the [unfilled] [Alcohol] : alcohol [No Complications] : there were no complications [Instructions Given] : handouts/patient instructions were given to patient [Patient Instructed to Call] : patient was instructed to call if redness at site, a decrease in range of motion or an increase in pain is noted after procedure. [de-identified] : Prolia  [FreeTextEntry1] : monitor for flulike sx or allergic rxn, take APAP as needed for first 3 days, if flulike sx persist call office\par \par \par

## 2020-06-15 NOTE — ASSESSMENT
[FreeTextEntry1] : 89 yo wf , lives alone  w/ OP and diffuse OA, multisite, hypothyroidism, HLD, \par \par 1) Osteoporosis:  Updated DExa 6/20/19 w/ most severe T score -2.1 and nearly 8% improvement over past study 2016 but Frax remains high with risk for hip fx at 4.7 %... given that she lives alone and has small stature, with advanced OA... still safest to continue Prolia.  \par Having missed nearly 1 yr of tx it does make sense to repeat dxa to see if significant change in scores. \par Continues to be  under tremendous stress about  multiple family issues to make any change at this time.  Will continue Prolia x 2 ys then likely switch to Reclast depending on risk assessment at that time. \par  VFA not done- again unable to perform as no nl vertebra available for comparison. \par -Exercises routinely- in past but past few months struggling 2/2 husbands death and family issues, not consistent  \par Continues w/  adequate  Ca/ Vit d intake updated labs needed now \par  + Risk factors:  small stature w/ BMI 24, one glass wine daily\par No previous fragility fractures,\par No hx renal calculi or radiation therapy\par Treatment:  \par - Prolia 12/17(1st dose)\par \par 2) OA multisite: stable, not active pain at this time except as noted regarding significant SS.. .  Overall despite stress doing well\par Hands: classic OA changes w/ CMC squaring and intermittent discomfort tolerable \par LS:  w/ confirmed SS w/ pain worse w/ standing, feels better pushing cart- tolerates golf w/ minimal discomfort and intermittent radicular/ facet arthropathy w/ pain localized, stiffness < 10 mins radiating at times into L buttock- sometimes down to L foot.  chronic, at times significant but responds to APAP, nothing stronger.  No previous interventional procedures or recent PT.  Recent increase in sx but no updated imaging... may consider open MRI in future but declined at this time. No overt weakness.. or bowel/ bladder incontinence, advised to call immediately if develops\par Discussion: \par Classic sx of SS tolerable at this point but would benefit from core strengthening to minimize impact and need for pharmacotherapy understood, but given current stressors, nothing formal. .\par \par 3) Stress: multiple issues, tolerated at this point with no complications.  Working closlely with family \par \par Plan:\par - #4 Prolia given 7/19/19... can't give next dose now - 1 wk early, not covered by insurance- then left town and did not receive dose at all.. now nearly 1 yr- 11 m since last injection. Concerned about rapid loss known to occur when stopped... should repeat dxa to evaluate... \par \par - Given dose today 6/12/20.. updated labs needed, last done 1/20 and ok..continues w/ routine wt bearing activity\par \par - Calcium and vit D:  needs updated measure w/ next labs.  Full metabolic w/u should be considered (PTH, SPEP as well- if minimal improvement over next study would suggest 24 hr urine for Ca)\par Ideally 30-45 mins moderate-intense wt bearing/ cardiovascular and strength training for optimal bone health \par \par Taking 4278-6080 mg Ca / 2000 IU vit daily between diet and supplements. Higher doses can cause kidney stones and contribut to atherosclerosis. The average diet contains 600-800 mg Calcium. \par \par - Vit D with goal mnt Vit D between 30-50 for optimal bone health usually requiring 4531-1409 IU daily supplement (hard to get enough in diet). \par Need 15-20 mins sunshine, unprotected daily for optimal Vit d from sun... \par \par - rto 6months, call ahead to be sure PA done for Prolia.... sooner if needed.  \par \par - call if want to get MRI LS.. or start PT

## 2020-06-15 NOTE — HISTORY OF PRESENT ILLNESS
[FreeTextEntry1] : 6/12/20\par - overall doing well, has been nearly 1 yr since last dose due to logistical issues...  ... last dose July 19  \par - No fractures . \par - tolerating Prolia over ys w/out issues. \par - Updated labs needed, last done 1/20 nl CBC, CMP, vit D 38 w/ nl TSH, PTH \par Son Stevie has pancreatic cancer... \par - life still  very stressful...  1 yr and living along- sad,  strong family support \par - most significant OA c/o low back pain/ leg pain trouble standing for long periods and limited walking 2/2 leg pain... needs MRI LS spine but has been too fearful of MRI\par \par 1) OP\par 2) OA\par _________________________________________________________________________\par \par \par (Initial HPI 6/15/17) \par Had been longstanding pt of Dr. Lugo, followed for OA and Osteoporosis, not seen since 2005.  Most recent Dexa suggests persistent OP and need for tx.  \par Also c/o back pain and frustration w/ activity intolerance\par \par 1) Osteoporosis:  Last DXA 9/12/16 w/ T score -2.6 at LFN  most severe w/ 11% drop since 2009 study and Frax 20% overall and 7.4% risk hip fx.  VFA not done, extensive DJD throughout spine\par -Exercises routinely, adequate (too high) Ca/ Vit d intake (need to see most recent levels, excellent well balanced diet w/ full dairy intake likely > 600-800 mg ca in diet alone \par - reportedly nl) no secondary conditions contributing to significant loss .  \par  + Risk factors:  small stature w/ BMI 24, one glass wine daily\par No previous fragility fractures, no smoking, thyroid dysfunction, parathyroid disease, longstanding anti sz, PPI, SSRI,  FH, or secondary risks such as malignancy/ chemo, celiac dz \par No hx renal calculi or radiation therapy\par Treatment:  \par - last Prolia 10/16 (1st dose)\par - Actonel ys ago severe esophagitis, diarrhea \par \par 2) OA multisite: \par Hands: classic OA changes w/ CMC squaring and intermittent discomfort tolerable \par LS:  w/ confirmed SS w/ pain worse w/ standing, feels better pushing cart- tolerates golf w/ minimal discomfort and intermittent radicular/ facet arthropathy w/ pain localized, stiffness < 10 mins radiating at times into L buttock- sometimes down to L foot.  chronic, at times significant but responds to APAP, nothing stronger.  No previous interventional procedures or recent PT.   Golfs routinely

## 2020-07-06 DIAGNOSIS — L57.0 ACTINIC KERATOSIS: ICD-10-CM

## 2020-07-06 DIAGNOSIS — B35.1 TINEA UNGUIUM: ICD-10-CM

## 2020-07-06 NOTE — PHYSICAL EXAM
[FreeTextEntry3] : Scalp, face, neck, ears, back, chest, UE's examined.\par \par Whitish superficial whitish patch of the bilateral distal great toenails, in a distinctly square patch.\par \par Keratotic papules, left nasal tip x 2, and left malar region.

## 2020-09-10 LAB — FUNGUS SPEC CULT ORG #8: ABNORMAL

## 2020-12-15 ENCOUNTER — APPOINTMENT (OUTPATIENT)
Dept: RHEUMATOLOGY | Facility: CLINIC | Age: 85
End: 2020-12-15
Payer: MEDICARE

## 2020-12-15 VITALS
WEIGHT: 133 LBS | HEART RATE: 88 BPM | DIASTOLIC BLOOD PRESSURE: 80 MMHG | BODY MASS INDEX: 26.81 KG/M2 | HEIGHT: 59 IN | OXYGEN SATURATION: 94 % | TEMPERATURE: 96.5 F | SYSTOLIC BLOOD PRESSURE: 120 MMHG

## 2020-12-15 PROCEDURE — 36415 COLL VENOUS BLD VENIPUNCTURE: CPT

## 2020-12-15 PROCEDURE — 96372 THER/PROPH/DIAG INJ SC/IM: CPT

## 2020-12-15 PROCEDURE — 99213 OFFICE O/P EST LOW 20 MIN: CPT | Mod: 25

## 2020-12-15 RX ORDER — DENOSUMAB 60 MG/ML
60 INJECTION SUBCUTANEOUS
Qty: 1 | Refills: 0 | Status: COMPLETED | OUTPATIENT
Start: 2020-12-15

## 2020-12-15 RX ADMIN — DENOSUMAB 0 MG/ML: 60 INJECTION SUBCUTANEOUS at 00:00

## 2020-12-15 NOTE — ASSESSMENT
[FreeTextEntry1] : 87 yo wf , lives alone  w/ OP and diffuse OA, multisite, hypothyroidism, HLD, \par \par 1) Osteoporosis:  Updated DExa 6/20/19 w/ most severe T score -2.1 and nearly 8% improvement over past study 2016 but Frax remains high with risk for hip fx at 4.7 %... given that she lives alone and has small stature, with advanced OA... still safest to continue Prolia.  \par Having missed nearly 1 yr of tx it does make sense to repeat dxa to see if significant change in scores. \par Continues to be  under tremendous stress about  multiple family issues to make any change at this time.  Will continue Prolia x 2 ys then likely switch to Reclast depending on risk assessment at that time. \par  VFA not done- again unable to perform as no nl vertebra available for comparison. \par -Was Exercising routinely- in past but past few months struggling 2/2 husbands death and family issues, not consistent ... needs to resume, recommended PT and activity tracker..  \par Continues w/  adequate  Ca/ Vit d intake updated labs needed now \par  + Risk factors:  small stature w/ BMI 24, one glass wine daily\par No previous fragility fractures,\par No hx renal calculi or radiation therapy\par Treatment:  \par - Prolia 12/17(1st dose)\par \par 2) OA multisite: stable, not active pain at this time except as noted regarding significant SS.. .  Overall despite stress doing well\par Hands: classic OA changes w/ CMC squaring and intermittent discomfort tolerable \par LS:  w/ confirmed SS w/ pain worse w/ standing, feels better pushing cart- tolerates golf w/ minimal discomfort and intermittent radicular/ facet arthropathy w/ pain localized, stiffness < 10 mins radiating at times into L buttock- sometimes down to L foot.  chronic, but overall not active recently.. at times significant but responds to APAP, nothing stronger.  No previous interventional procedures or recent PT- will start now.. .. No overt weakness.. or bowel/ bladder incontinence, advised to call immediately if develops\par Discussion: \par Classic sx of SS tolerable at this point but would benefit from core strengthening to minimize impact and need for pharmacotherapy understood- now needs to focus on strengthening/ wt loss.. \par \par 3) Stress/ Situational depression: multiple issues, tolerated at this point with no complications.  Working closely with family. Started on lexapro per Dr. Shaver, now sure if effective.. still tearful frequently.. but pandemic isn't helping..  \par \par Plan:\par - Continue q 6 m Prolia.. will reeval after 7/6/21... updated dexa needed \par \par - Given dose today 12/15/20.. .. updated labs needed, last done 1/20 and ok..continues w/ routine wt bearing activity- repeat labs today \par \par - Calcium and vit D:  needs updated measure w/ next labs.  \par \par - Repeat DXA  if minimal improvement over next study would suggest 24 hr urine for Ca)\par Ideally 30-45 mins moderate-intense wt bearing/ cardiovascular and strength training for optimal bone health \par \par Taking 5675-3522 mg Ca / 2000 IU vit daily between diet and supplements. Higher doses can cause kidney stones and contribut to atherosclerosis. The average diet contains 600-800 mg Calcium. \par \par - Vit D with goal mnt Vit D between 30-50 for optimal bone health usually requiring 8392-3413 IU daily supplement (hard to get enough in diet). \par Need 15-20 mins sunshine, unprotected daily for optimal Vit d from sun... \par \par - TEB visit in 2 m to f/u on mood, back / leg pain and activity level, response to PT.. \par \par - rto 6months, call ahead to be sure PA done for Prolia.... sooner if needed.  \par \par - call if want to get MRI LS.

## 2020-12-15 NOTE — PROCEDURE
[Today's Date:] : Date: [unfilled] [Risks] : risks [Benefits] : benefits [Consent Obtained] : written consent was obtained prior to the procedure and is detailed in the patient's record [Patient] : Prior to the start of the procedure a time out was taken and the identity of the patient was confirmed via name and date of birth with the patient. The correct site and the procedure to be performed were confirmed. The correct side was confirmed if applicable. The availability of the correct equipment was verified [Therapeutic] : therapeutic [#1 Site: ______] : #1 site identified in the [unfilled] [Alcohol] : alcohol [No Complications] : there were no complications [Instructions Given] : handouts/patient instructions were given to patient [Patient Instructed to Call] : patient was instructed to call if redness at site, a decrease in range of motion or an increase in pain is noted after procedure. [de-identified] : Prolia  [FreeTextEntry1] : monitor for flulike sx or allergic rxn, take APAP as needed for first 3 days, if flulike sx persist call office\par \par \par

## 2020-12-15 NOTE — PHYSICAL EXAM
[General Appearance - Alert] : alert [General Appearance - In No Acute Distress] : in no acute distress [General Appearance - Well Nourished] : well nourished [General Appearance - Well Developed] : well developed [General Appearance - Well-Appearing] : healthy appearing [] : no respiratory distress [Auscultation Breath Sounds / Voice Sounds] : lungs were clear to auscultation bilaterally [Heart Rate And Rhythm] : heart rate was normal and rhythm regular [Heart Sounds] : normal S1 and S2 [Heart Sounds Gallop] : no gallops [Murmurs] : no murmurs [Heart Sounds Pericardial Friction Rub] : no pericardial rub [Edema] : there was no peripheral edema [No CVA Tenderness] : no ~M costovertebral angle tenderness [No Spinal Tenderness] : no spinal tenderness [Motor Exam] : the motor exam was normal [No Focal Deficits] : no focal deficits [Oriented To Time, Place, And Person] : oriented to person, place, and time [Impaired Insight] : insight and judgment were intact [Affect] : the affect was normal [Mood] : the mood was normal [Over the Past 2 Weeks, Have You Felt Down, Depressed, or Hopeless?] : 1.) Over the past 2 weeks, have you felt down, depressed, or hopeless? No [Over the Past 2 Weeks, Have You Felt Little Interest or Pleasure Doing Things?] : 2.) Over the past 2 weeks, have you felt little interest or pleasure doing things? No [FreeTextEntry1] : admits to situational depression, Sad

## 2020-12-15 NOTE — REVIEW OF SYSTEMS
[Arthralgias] : arthralgias [As Noted in HPI] : as noted in HPI [Negative] : Heme/Lymph [FreeTextEntry9] : tolerable but also describing neurogenic claucidation.. limits activity 20-30 mins walk max  [de-identified] : no obvious weakness  [de-identified] : situational stress r/t recent  and loss son.. leaving grandson 16 yo alone.. very worried - started lexapro 5 mg ? effective [de-identified] : updated labs needed? current vit D level last measrue 8/18 at 36

## 2020-12-15 NOTE — HISTORY OF PRESENT ILLNESS
[FreeTextEntry1] : \par 12/15/20 \par - son  of pancreatic ca  and left 18 yo grandson.. very sad, depressed- life still  very stressful...  2 yr and living along- sad,  strong family support \par - wt gain.. nearly 15 lbs in past 6m.. some activity but admits to minimal activity, upset, hasn't been this heavy in past.  Walks 10-15 mins daily in addition to routine housework.. \par - did fall last summer, no fractures. Updated CXR/ Thoracic spine neg \par - tolerating Prolia over ys w/out issues. \par - Updated labs needed, last done  nl CMP w/ nl Uric Acid.  No recent  vit D 38 w/ nl TSH, PTH \par - most significant OA c/o low back pain/ leg pain trouble standing for long periods and limited walking 2/2 leg pain- though denies recently.. recommended PT ... needs MRI LS spine but has been too fearful of MRI\par \par 1) OP\par 2) OA\par _________________________________________________________________________\par \par \par (Initial HPI 6/15/17) \par Had been longstanding pt of Dr. Lugo, followed for OA and Osteoporosis, not seen since .  Most recent Dexa suggests persistent OP and need for tx.  \par Also c/o back pain and frustration w/ activity intolerance\par \par 1) Osteoporosis:  Last DXA 16 w/ T score -2.6 at LFN  most severe w/ 11% drop since  study and Frax 20% overall and 7.4% risk hip fx.  VFA not done, extensive DJD throughout spine\par -Exercises routinely, adequate (too high) Ca/ Vit d intake (need to see most recent levels, excellent well balanced diet w/ full dairy intake likely > 600-800 mg ca in diet alone \par - reportedly nl) no secondary conditions contributing to significant loss .  \par  + Risk factors:  small stature w/ BMI 24, one glass wine daily\par No previous fragility fractures, no smoking, thyroid dysfunction, parathyroid disease, longstanding anti sz, PPI, SSRI,  FH, or secondary risks such as malignancy/ chemo, celiac dz \par No hx renal calculi or radiation therapy\par Treatment:  \par - last Prolia 10/16 (1st dose)\par - Actonel ys ago severe esophagitis, diarrhea \par \par 2) OA multisite: \par Hands: classic OA changes w/ CMC squaring and intermittent discomfort tolerable \par LS:  w/ confirmed SS w/ pain worse w/ standing, feels better pushing cart- tolerates golf w/ minimal discomfort and intermittent radicular/ facet arthropathy w/ pain localized, stiffness < 10 mins radiating at times into L buttock- sometimes down to L foot.  chronic, at times significant but responds to APAP, nothing stronger.  No previous interventional procedures or recent PT.   Golfs routinely

## 2020-12-17 LAB
25(OH)D3 SERPL-MCNC: 34.1 NG/ML
CALCIUM SERPL-MCNC: 9.6 MG/DL
ERYTHROCYTE [SEDIMENTATION RATE] IN BLOOD BY WESTERGREN METHOD: 14 MM/HR
PARATHYROID HORMONE INTACT: 26 PG/ML
TSH SERPL-ACNC: 3.52 UIU/ML

## 2020-12-19 LAB — SARS-COV-2 N GENE NPH QL NAA+PROBE: NOT DETECTED

## 2021-01-29 ENCOUNTER — APPOINTMENT (OUTPATIENT)
Dept: RHEUMATOLOGY | Facility: CLINIC | Age: 86
End: 2021-01-29

## 2021-02-26 LAB — SARS-COV-2 N GENE NPH QL NAA+PROBE: NOT DETECTED

## 2021-06-17 ENCOUNTER — APPOINTMENT (OUTPATIENT)
Dept: RHEUMATOLOGY | Facility: CLINIC | Age: 86
End: 2021-06-17
Payer: MEDICARE

## 2021-06-17 VITALS
TEMPERATURE: 97.6 F | DIASTOLIC BLOOD PRESSURE: 72 MMHG | BODY MASS INDEX: 27.27 KG/M2 | OXYGEN SATURATION: 96 % | WEIGHT: 135 LBS | HEART RATE: 76 BPM | SYSTOLIC BLOOD PRESSURE: 128 MMHG

## 2021-06-17 PROCEDURE — 96372 THER/PROPH/DIAG INJ SC/IM: CPT

## 2021-06-17 PROCEDURE — 99214 OFFICE O/P EST MOD 30 MIN: CPT | Mod: 25

## 2021-06-17 RX ORDER — DENOSUMAB 60 MG/ML
60 INJECTION SUBCUTANEOUS
Qty: 1 | Refills: 0 | Status: COMPLETED | OUTPATIENT
Start: 2021-06-17

## 2021-06-17 RX ADMIN — DENOSUMAB 0 MG/ML: 60 INJECTION SUBCUTANEOUS at 00:00

## 2021-06-17 NOTE — REVIEW OF SYSTEMS
[Arthralgias] : arthralgias [As Noted in HPI] : as noted in HPI [Negative] : Heme/Lymph [FreeTextEntry9] : tolerable but also describing neurogenic claucidation.. limits activity 20-30 mins walk max  [de-identified] : no obvious weakness  [de-identified] : situational stress r/t recent  and loss son.. leaving grandson 16 yo alone.. very worried - started lexapro 5 mg ? effective [de-identified] : updated labs needed? current vit D level last measrue 8/18 at 36

## 2021-06-17 NOTE — PHYSICAL EXAM
[General Appearance - Alert] : alert [General Appearance - In No Acute Distress] : in no acute distress [General Appearance - Well Developed] : well developed [General Appearance - Well Nourished] : well nourished [General Appearance - Well-Appearing] : healthy appearing [] : no respiratory distress [Auscultation Breath Sounds / Voice Sounds] : lungs were clear to auscultation bilaterally [Heart Rate And Rhythm] : heart rate was normal and rhythm regular [Heart Sounds] : normal S1 and S2 [Heart Sounds Gallop] : no gallops [Murmurs] : no murmurs [Heart Sounds Pericardial Friction Rub] : no pericardial rub [Edema] : there was no peripheral edema [No CVA Tenderness] : no ~M costovertebral angle tenderness [No Spinal Tenderness] : no spinal tenderness [Motor Exam] : the motor exam was normal [No Focal Deficits] : no focal deficits [Oriented To Time, Place, And Person] : oriented to person, place, and time [Impaired Insight] : insight and judgment were intact [Affect] : the affect was normal [Mood] : the mood was normal [Over the Past 2 Weeks, Have You Felt Down, Depressed, or Hopeless?] : 1.) Over the past 2 weeks, have you felt down, depressed, or hopeless? No [Over the Past 2 Weeks, Have You Felt Little Interest or Pleasure Doing Things?] : 2.) Over the past 2 weeks, have you felt little interest or pleasure doing things? No [FreeTextEntry1] : admits to situational depression, Sad

## 2021-06-17 NOTE — PROCEDURE
[Today's Date:] : Date: [unfilled] [Risks] : risks [Benefits] : benefits [Consent Obtained] : written consent was obtained prior to the procedure and is detailed in the patient's record [Patient] : Prior to the start of the procedure a time out was taken and the identity of the patient was confirmed via name and date of birth with the patient. The correct site and the procedure to be performed were confirmed. The correct side was confirmed if applicable. The availability of the correct equipment was verified [Therapeutic] : therapeutic [#1 Site: ______] : #1 site identified in the [unfilled] [Alcohol] : alcohol [No Complications] : there were no complications [Instructions Given] : handouts/patient instructions were given to patient [Patient Instructed to Call] : patient was instructed to call if redness at site, a decrease in range of motion or an increase in pain is noted after procedure. [de-identified] : Prolia  [FreeTextEntry1] : monitor for flulike sx or allergic rxn, take APAP as needed for first 3 days, if flulike sx persist call office\par \par \par

## 2021-06-17 NOTE — HISTORY OF PRESENT ILLNESS
[FreeTextEntry1] : 21 \par - son  of pancreatic ca  and left 18 yo grandson continues to struggle with reactive depression, on escitalopram 5 mg but admits to persistent depression, lack motivation, energy, struggling to find stewart.. isolating self more than would like to.. still responding to loss of  as well from 2019.  \par - Minimal physical activity, no routine exercise, plays golf once/ wk but no walking or other activity beyond caring for self living alone \par - wt gain nearly 15 lbs in past ys, stable past several months.. but frustrated though admits not doing anything about it\par - recently notes CABRERA even with mild activity.. and occas palpitations "feels heart racing with activity"..denies cp/ dizziness with this.. son suggested she get this evaluated, hasn't called PCP.. \par - Spinal stenosis well documented, no c/o discomfort from it but admits not active... declined PT in past and hasn’t' had MR- too fearful \par - tolerating Prolia over ys w/out issues. \par - Updated labs needed, last done  nl CMP w/ nl Uric Acid.  No recent  vit D 38 w/ nl TSH, PTH \par \par 1) OP\par 2) OA\par 3) Depression, reactive\par 4) Palpitations\par _________________________________________________________________________\par \par \par (Initial HPI 6/15/17) \par Had been longstanding pt of Dr. Lugo, followed for OA and Osteoporosis, not seen since 2005.  Most recent Dexa suggests persistent OP and need for tx.  \par Also c/o back pain and frustration w/ activity intolerance\par \par 1) Osteoporosis:  Last DXA 16 w/ T score -2.6 at [facility]  most severe w/ 11% drop since  study and Frax 20% overall and 7.4% risk hip fx.  VFA not done, extensive DJD throughout spine\par -Exercises routinely, adequate (too high) Ca/ Vit d intake (need to see most recent levels, excellent well balanced diet w/ full dairy intake likely > 600-800 mg ca in diet alone \par - reportedly nl) no secondary conditions contributing to significant loss .  \par  + Risk factors:  small stature w/ BMI 24, one glass wine daily\par No previous fragility fractures, no smoking, thyroid dysfunction, parathyroid disease, longstanding anti sz, PPI, SSRI,  FH, or secondary risks such as malignancy/ chemo, celiac dz \par No hx renal calculi or radiation therapy\par Treatment:  \par - last Prolia 10/16 (1st dose)\par - Actonel ys ago severe esophagitis, diarrhea \par \par 2) OA multisite: \par Hands: classic OA changes w/ CMC squaring and intermittent discomfort tolerable \par LS:  w/ confirmed SS w/ pain worse w/ standing, feels better pushing cart- tolerates golf w/ minimal discomfort and intermittent radicular/ facet arthropathy w/ pain localized, stiffness < 10 mins radiating at times into L buttock- sometimes down to L foot.  chronic, at times significant but responds to APAP, nothing stronger.  No previous interventional procedures or recent PT.   Golfs routinely

## 2021-06-17 NOTE — ASSESSMENT
[FreeTextEntry1] : 89 yo wf , lives alone  w/ OP and diffuse OA, multisite, hypothyroidism, HLD, and new onset palpitations (6/21) \par \par 1) Osteoporosis:  Updated DExa 6/20/19 w/ most severe T score -2.1 and nearly 8% improvement over past study 2016 but Frax remains high with risk for hip fx at 4.7 %... given that she lives alone and has small stature, with advanced OA... still safest to continue Prolia.  \par Continues to be  under tremendous stress about  multiple family issues to make any change at this time.  Will continue Prolia x 2 ys then likely switch to Reclast depending on risk assessment at that time. \par  VFA not done- again unable to perform as no nl vertebra available for comparison. \par -Was Exercising routinely- in past but past few months struggling 2/2 husbands death followed soon after by son's death.. and hasn't resumed anything (see below)\par Continues w/  adequate  Ca/ Vit d intake updated labs needed now \par  + Risk factors:  small stature w/ BMI 24, one glass wine daily\par No previous fragility fractures,\par No hx renal calculi or radiation therapy\par Treatment:  \par - Prolia 12/17(1st dose)\par \par 2) OA multisite: stable, not active pain at this time except as noted regarding significant SS.. .  Overall despite stress doing well but NOT active.  Needs to increase activity but struggling with motivation (see below) and now new onset palpitations \par Hands: classic OA changes w/ CMC squaring and intermittent discomfort tolerable \par LS:  w/ confirmed SS w/ pain worse w/ standing, feels better pushing cart- tolerates golf w/ minimal discomfort and intermittent radicular/ facet arthropathy w/ pain localized, stiffness < 10 mins radiating at times into L buttock- sometimes down to L foot.  chronic, but overall not active recently.. at times significant but responds to APAP, nothing stronger.  No previous interventional procedures or recent PT- will start now.. .. No overt weakness.. or bowel/ bladder incontinence, advised to call immediately if develops\par Discussion: \par Classic sx of SS tolerable at this point but would benefit from core strengthening to minimize impact and need for pharmacotherapy understood- now needs to focus on strengthening/ wt loss.. \par \par 3) Stress/ Situational depression: multiple issues, tolerated at this point with no complications.  Working closely with family. Started on lexapro per Dr. Gilmore, now sure if effective at 5 mg for past y, recommend increasing to 10 mg (well tolerated).. still tearful frequently.  Called to d/w Dr. Gilmore \par \par 4) Palpitations: new onset w/ associated CABRERA.. needs w/u, called to d/w Dr. Gilmore and encouraged to call Dr. Andrews's office for consultation as well.. \par \par Plan:\par - Continue q 6 m Prolia.. will reeval after 7/6/21... updated dexa needed \par \par - Calcium and vit D:  needs updated measure w/ next labs.  \par \par - Repeat DXA  if minimal improvement over next study would suggest 24 hr urine for Ca)\par Ideally 30-45 mins moderate-intense wt bearing/ cardiovascular and strength training for optimal bone health \par \par Taking 4495-3865 mg Ca / 2000 IU vit daily between diet and supplements. Higher doses can cause kidney stones and contribut to atherosclerosis. The average diet contains 600-800 mg Calcium. \par \par - Vit D with goal mnt Vit D between 30-50 for optimal bone health usually requiring 6312-1559 IU daily supplement (hard to get enough in diet). \par Need 15-20 mins sunshine, unprotected daily for optimal Vit d from sun... \par \par - needs to f/u with PCP, cardiologist re:  CABRERA/ palpitations, called to d/w Dr. Gilmore\par \par - increase lexapro to 10 mg now  and may need to consider counseling given degree of major losses in past few ys \par \par - rto 3 m for f/u on issues above and 6 months for PRolia\par \par -  call ahead to be sure PA done for Prolia.... sooner if needed.  \par \par - call if want to get MRI LS or start PT..

## 2021-10-05 ENCOUNTER — APPOINTMENT (OUTPATIENT)
Dept: RHEUMATOLOGY | Facility: CLINIC | Age: 86
End: 2021-10-05
Payer: MEDICARE

## 2021-10-05 ENCOUNTER — RESULT REVIEW (OUTPATIENT)
Age: 86
End: 2021-10-05

## 2021-10-05 VITALS
HEART RATE: 68 BPM | OXYGEN SATURATION: 98 % | TEMPERATURE: 97.9 F | SYSTOLIC BLOOD PRESSURE: 122 MMHG | WEIGHT: 133 LBS | DIASTOLIC BLOOD PRESSURE: 70 MMHG | BODY MASS INDEX: 26.86 KG/M2

## 2021-10-05 DIAGNOSIS — F43.21 ADJUSTMENT DISORDER WITH DEPRESSED MOOD: ICD-10-CM

## 2021-10-05 DIAGNOSIS — F43.9 REACTION TO SEVERE STRESS, UNSPECIFIED: ICD-10-CM

## 2021-10-05 PROCEDURE — 99213 OFFICE O/P EST LOW 20 MIN: CPT | Mod: 25

## 2021-10-05 PROCEDURE — G0008: CPT

## 2021-10-05 PROCEDURE — 90662 IIV NO PRSV INCREASED AG IM: CPT

## 2021-10-05 NOTE — CONSULT LETTER
[Dear  ___] : Dear  [unfilled], [Courtesy Letter:] : I had the pleasure of seeing your patient, [unfilled], in my office today. [Consult Closing:] : Thank you very much for allowing me to participate in the care of this patient.  If you have any questions, please do not hesitate to contact me. [Sincerely,] : Sincerely, [FreeTextEntry2] : Luis Fernando Shaver MD  [FreeTextEntry1] : Please see below for concerns regarding short term memory problems- NEW...\par \par 89 yo wf , lives alone  w/ OP and diffuse OA, multisite, hypothyroidism, HLD\par Presents today, not sure why here.. too early for Prolia and issues throughout w/ memory asking same question several times, \par \par 1) Osteoporosis:  Updated DExa 6/20/19 w/ most severe T score -2.1 and nearly 8% improvement over past study 2016 but Frax remains high with risk for hip fx at 4.7 %... given that she lives alone and has small stature, with advanced OA... still safest to continue Prolia.  \par Having missed nearly 1 yr of tx it does make sense to repeat dxa to see if significant change in scores. \par Continues to be  under tremendous stress about  multiple family issues to make any change at this time.  Will continue Prolia x 2 ys then likely switch to Reclast depending on risk assessment at that time. \par  VFA not done- again unable to perform as no nl vertebra available for comparison. \par -Was Exercising routinely- in past but past few months struggling 2/2 husbands death and family issues, not consistent ... needs to resume, recommended PT and activity tracker..  \par Continues w/  adequate  Ca/ Vit d intake updated labs needed now \par  + Risk factors:  small stature w/ BMI 24, one glass wine daily\par No previous fragility fractures,\par No hx renal calculi or radiation therapy\par Treatment:  \par - Prolia 12/17(1st dose)\par \par 2) OA multisite: stable, not active pain at this time except as noted regarding significant SS.. .  Overall despite stress doing well\par Hands: classic OA changes w/ CMC squaring and intermittent discomfort tolerable \par LS:  w/ confirmed SS w/ pain worse w/ standing, feels better pushing cart- tolerates golf w/ minimal discomfort and intermittent radicular/ facet arthropathy w/ pain localized, stiffness < 10 mins radiating at times into L buttock- sometimes down to L foot.  chronic, but overall not active recently.. at times significant but responds to APAP, nothing stronger.  No previous interventional procedures or recent PT- will start now.. .. No overt weakness.. or bowel/ bladder incontinence, advised to call immediately if develops\par Discussion: \par Classic sx of SS tolerable at this point but would benefit from core strengthening to minimize impact and need for pharmacotherapy understood- now needs to focus on strengthening/ wt loss.. \par \par 3) Stress/ Situational depression and noted NEW Short term memory isssues: multiple issues, tolerated at this point with no complications... till now c/o memory issues.  \par   Working closely with family. Started on lexapro per Dr. Shaver, now sure if effective but didn't continue ... \par \par Plan:\par - Continue q 6 m Prolia.. next dose 12/17/21 but ... updated dexa needed \par \par - Given fluzone HD today  ..\par \par - Calcium and vit D:  needs updated measure w/ next labs.  \par \par - Repeat DXA  if minimal improvement over next study would suggest 24 hr urine for Ca)\par Ideally 30-45 mins moderate-intense wt bearing/ cardiovascular and strength training for optimal bone health \par \par Taking 2805-2643 mg Ca / 2000 IU vit daily between diet and supplements. Higher doses can cause kidney stones and contribut to atherosclerosis. The average diet contains 600-800 mg Calcium. \par \par - Vit D with goal mnt Vit D between 30-50 for optimal bone health usually requiring 4314-8751 IU daily supplement (hard to get enough in diet). \par Need 15-20 mins sunshine, unprotected daily for optimal Vit d from sun... \par \par - rto 3 m for Prolia.\par \par - call to son to discuss concerns about memory, should also f/u with Dr. Sahver\par \par - call if want to get MRI LS...  [FreeTextEntry3] : Geno Jones DNP, ANP-C\par Division or Rheumatology\par [facility]\par \par

## 2021-10-05 NOTE — REVIEW OF SYSTEMS
[Arthralgias] : arthralgias [As Noted in HPI] : as noted in HPI [Negative] : Heme/Lymph [FreeTextEntry9] : tolerable but also describing neurogenic claudication.. limits activity 20-30 mins walk max  [de-identified] : no obvious weakness  [de-identified] : situational stress r/t recent  and loss son... very worried - started lexapro 5 mg not continued..  [de-identified] : updated labs needed? current vit D level last measrue 8/18 at 36

## 2021-10-05 NOTE — ASSESSMENT
[FreeTextEntry1] : 88 yo wf , lives alone  w/ OP and diffuse OA, multisite, hypothyroidism, HLD\par Presents today, not sure why here.. too early for Prolia and issues throughout w/ memory asking same question several times, \par \par 1) Osteoporosis:  Updated DExa 6/20/19 w/ most severe T score -2.1 and nearly 8% improvement over past study 2016 but Frax remains high with risk for hip fx at 4.7 %... given that she lives alone and has small stature, with advanced OA... still safest to continue Prolia.  \par Having missed nearly 1 yr of tx it does make sense to repeat dxa to see if significant change in scores. \par Continues to be  under tremendous stress about  multiple family issues to make any change at this time.  Will continue Prolia x 2 ys then likely switch to Reclast depending on risk assessment at that time. \par  VFA not done- again unable to perform as no nl vertebra available for comparison. \par -Was Exercising routinely- in past but past few months struggling 2/2 husbands death and family issues, not consistent ... needs to resume, recommended PT and activity tracker..  \par Continues w/  adequate  Ca/ Vit d intake updated labs needed now \par  + Risk factors:  small stature w/ BMI 24, one glass wine daily\par No previous fragility fractures,\par No hx renal calculi or radiation therapy\par Treatment:  \par - Prolia 12/17(1st dose)\par \par 2) OA multisite: stable, not active pain at this time except as noted regarding significant SS.. .  Overall despite stress doing well\par Hands: classic OA changes w/ CMC squaring and intermittent discomfort tolerable \par LS:  w/ confirmed SS w/ pain worse w/ standing, feels better pushing cart- tolerates golf w/ minimal discomfort and intermittent radicular/ facet arthropathy w/ pain localized, stiffness < 10 mins radiating at times into L buttock- sometimes down to L foot.  chronic, but overall not active recently.. at times significant but responds to APAP, nothing stronger.  No previous interventional procedures or recent PT- will start now.. .. No overt weakness.. or bowel/ bladder incontinence, advised to call immediately if develops\par Discussion: \par Classic sx of SS tolerable at this point but would benefit from core strengthening to minimize impact and need for pharmacotherapy understood- now needs to focus on strengthening/ wt loss.. \par \par 3) Stress/ Situational depression and noted NEW Short term memory isssues: multiple issues, tolerated at this point with no complications... till now c/o memory issues.  \par   Working closely with family. Started on lexapro per Dr. Shaver, now sure if effective but didn't continue ... \par \par Plan:\par - Continue q 6 m Prolia.. next dose 12/17/21 but ... updated dexa needed \par \par - Given fluzone HD today  ..\par \par - Calcium and vit D:  needs updated measure w/ next labs.  \par \par - Repeat DXA  if minimal improvement over next study would suggest 24 hr urine for Ca)\par Ideally 30-45 mins moderate-intense wt bearing/ cardiovascular and strength training for optimal bone health \par \par Taking 8864-1804 mg Ca / 2000 IU vit daily between diet and supplements. Higher doses can cause kidney stones and contribut to atherosclerosis. The average diet contains 600-800 mg Calcium. \par \par - Vit D with goal mnt Vit D between 30-50 for optimal bone health usually requiring 3962-8891 IU daily supplement (hard to get enough in diet). \par Need 15-20 mins sunshine, unprotected daily for optimal Vit d from sun... \par \par - rto 3 m for Prolia.\par \par - call to son to discuss concerns about memory, should also f/u with Dr. Shaver\par \par - call if want to get MRI LS...

## 2021-10-05 NOTE — HISTORY OF PRESENT ILLNESS
[FreeTextEntry1] : 10/5/21\par - not clear why she had appointment today, is not due for Prolia till 12/17/21.. \par - was supposed to get updated Dexa, not done \par - Asking about booster vacccine for COVID otherwise reports feeling well\par - Admits to short term memory issues.. increased lately... still struggling with depression following loss of  and son in past few ys and now nephew dying of liver failure too...Grandson off to College.. describes supportive family \par - Updated labs needed, last done 9/20 nl CMP w/ nl Uric Acid.  No recent  vit D 38 w/ nl TSH, PTH again ordered  \par - most significant OA but not complaining of pain at this time..  c/o low back pain/ leg pain trouble standing for long periods and limited walking 2/2 leg pain- though denies recently.. recommended PT ... needs MRI LS spine but has been too fearful of MRI\par \par 1) OP\par 2) OA\par _________________________________________________________________________\par \par \par (Initial HPI 6/15/17) \par Had been longstanding pt of Dr. Lugo, followed for OA and Osteoporosis, not seen since 2005.  Most recent Dexa suggests persistent OP and need for tx.  \par Also c/o back pain and frustration w/ activity intolerance\par \par 1) Osteoporosis:  Last DXA 9/12/16 w/ T score -2.6 at L[facility]  most severe w/ 11% drop since 2009 study and Frax 20% overall and 7.4% risk hip fx.  VFA not done, extensive DJD throughout spine\par -Exercises routinely, adequate (too high) Ca/ Vit d intake (need to see most recent levels, excellent well balanced diet w/ full dairy intake likely > 600-800 mg ca in diet alone \par - reportedly nl) no secondary conditions contributing to significant loss .  \par  + Risk factors:  small stature w/ BMI 24, one glass wine daily\par No previous fragility fractures, no smoking, thyroid dysfunction, parathyroid disease, longstanding anti sz, PPI, SSRI,  FH, or secondary risks such as malignancy/ chemo, celiac dz \par No hx renal calculi or radiation therapy\par Treatment:  \par - last Prolia 10/16 (1st dose)\par - Actonel ys ago severe esophagitis, diarrhea \par \par 2) OA multisite: \par Hands: classic OA changes w/ CMC squaring and intermittent discomfort tolerable \par LS:  w/ confirmed SS w/ pain worse w/ standing, feels better pushing cart- tolerates golf w/ minimal discomfort and intermittent radicular/ facet arthropathy w/ pain localized, stiffness < 10 mins radiating at times into L buttock- sometimes down to L foot.  chronic, at times significant but responds to APAP, nothing stronger.  No previous interventional procedures or recent PT.   Golfs routinely

## 2021-10-05 NOTE — PROCEDURE
[Today's Date:] : Date: [unfilled] [Risks] : risks [Benefits] : benefits [Consent Obtained] : written consent was obtained prior to the procedure and is detailed in the patient's record [Patient] : Prior to the start of the procedure a time out was taken and the identity of the patient was confirmed via name and date of birth with the patient. The correct site and the procedure to be performed were confirmed. The correct side was confirmed if applicable. The availability of the correct equipment was verified [Therapeutic] : therapeutic [#1 Site: ______] : #1 site identified in the [unfilled] [Alcohol] : alcohol [No Complications] : there were no complications [Instructions Given] : handouts/patient instructions were given to patient [Patient Instructed to Call] : patient was instructed to call if redness at site, a decrease in range of motion or an increase in pain is noted after procedure. [25 gauge 5/8  inch] : A 25 gauge 5/8 inch needle was used [de-identified] : Fluzone HD  [FreeTextEntry1] : monitor for flulike sx or allergic rxn, take APAP as needed for first 3 days, if flulike sx persist call office\par \par \par

## 2021-10-05 NOTE — PHYSICAL EXAM
[General Appearance - Alert] : alert [General Appearance - In No Acute Distress] : in no acute distress [General Appearance - Well Nourished] : well nourished [General Appearance - Well Developed] : well developed [General Appearance - Well-Appearing] : healthy appearing [] : no respiratory distress [Auscultation Breath Sounds / Voice Sounds] : lungs were clear to auscultation bilaterally [Heart Rate And Rhythm] : heart rate was normal and rhythm regular [Heart Sounds] : normal S1 and S2 [Heart Sounds Gallop] : no gallops [Murmurs] : no murmurs [Heart Sounds Pericardial Friction Rub] : no pericardial rub [Edema] : there was no peripheral edema [No CVA Tenderness] : no ~M costovertebral angle tenderness [No Spinal Tenderness] : no spinal tenderness [Motor Exam] : the motor exam was normal [No Focal Deficits] : no focal deficits [Oriented To Time, Place, And Person] : oriented to person, place, and time [Impaired Insight] : insight and judgment were intact [Affect] : the affect was normal [Mood] : the mood was normal [Over the Past 2 Weeks, Have You Felt Down, Depressed, or Hopeless?] : 1.) Over the past 2 weeks, have you felt down, depressed, or hopeless? No [Over the Past 2 Weeks, Have You Felt Little Interest or Pleasure Doing Things?] : 2.) Over the past 2 weeks, have you felt little interest or pleasure doing things? No [FreeTextEntry1] : admits to situational depression, Sad- short term memory issues noted- asked same questions several times even after writing it down..

## 2021-10-08 ENCOUNTER — APPOINTMENT (OUTPATIENT)
Dept: RADIOLOGY | Facility: CLINIC | Age: 86
End: 2021-10-08
Payer: MEDICARE

## 2021-10-08 ENCOUNTER — OUTPATIENT (OUTPATIENT)
Dept: OUTPATIENT SERVICES | Facility: HOSPITAL | Age: 86
LOS: 1 days | End: 2021-10-08
Payer: MEDICARE

## 2021-10-08 DIAGNOSIS — M81.0 AGE-RELATED OSTEOPOROSIS WITHOUT CURRENT PATHOLOGICAL FRACTURE: ICD-10-CM

## 2021-10-08 DIAGNOSIS — Z96.653 PRESENCE OF ARTIFICIAL KNEE JOINT, BILATERAL: Chronic | ICD-10-CM

## 2021-10-08 DIAGNOSIS — Z98.890 OTHER SPECIFIED POSTPROCEDURAL STATES: Chronic | ICD-10-CM

## 2021-10-08 PROCEDURE — 77085 DXA BONE DENSITY AXL VRT FX: CPT | Mod: 26

## 2021-10-08 PROCEDURE — 77080 DXA BONE DENSITY AXIAL: CPT

## 2021-10-08 PROCEDURE — 77085 DXA BONE DENSITY AXL VRT FX: CPT

## 2021-10-12 LAB
25(OH)D3 SERPL-MCNC: 32.6 NG/ML
ALBUMIN SERPL ELPH-MCNC: 4.4 G/DL
ALP BLD-CCNC: 44 U/L
ALT SERPL-CCNC: 16 U/L
ANION GAP SERPL CALC-SCNC: 14 MMOL/L
AST SERPL-CCNC: 21 U/L
BASOPHILS # BLD AUTO: 0.07 K/UL
BASOPHILS NFR BLD AUTO: 1.2 %
BILIRUB SERPL-MCNC: 0.3 MG/DL
BUN SERPL-MCNC: 17 MG/DL
CALCIUM SERPL-MCNC: 9.5 MG/DL
CALCIUM SERPL-MCNC: 9.5 MG/DL
CHLORIDE SERPL-SCNC: 104 MMOL/L
CO2 SERPL-SCNC: 24 MMOL/L
CREAT SERPL-MCNC: 0.7 MG/DL
CRP SERPL-MCNC: 13 MG/L
EOSINOPHIL # BLD AUTO: 0.56 K/UL
EOSINOPHIL NFR BLD AUTO: 9.4 %
ERYTHROCYTE [SEDIMENTATION RATE] IN BLOOD BY WESTERGREN METHOD: 17 MM/HR
GLUCOSE SERPL-MCNC: 93 MG/DL
HCT VFR BLD CALC: 42.5 %
HGB BLD-MCNC: 14.1 G/DL
IMM GRANULOCYTES NFR BLD AUTO: 0.5 %
LYMPHOCYTES # BLD AUTO: 1.94 K/UL
LYMPHOCYTES NFR BLD AUTO: 32.7 %
MAN DIFF?: NORMAL
MCHC RBC-ENTMCNC: 31.3 PG
MCHC RBC-ENTMCNC: 33.2 GM/DL
MCV RBC AUTO: 94.2 FL
MONOCYTES # BLD AUTO: 0.68 K/UL
MONOCYTES NFR BLD AUTO: 11.4 %
NEUTROPHILS # BLD AUTO: 2.66 K/UL
NEUTROPHILS NFR BLD AUTO: 44.8 %
PARATHYROID HORMONE INTACT: 25 PG/ML
PLATELET # BLD AUTO: 299 K/UL
POTASSIUM SERPL-SCNC: 4.4 MMOL/L
PROT SERPL-MCNC: 7 G/DL
RBC # BLD: 4.51 M/UL
RBC # FLD: 14.5 %
SODIUM SERPL-SCNC: 141 MMOL/L
TSH SERPL-ACNC: 4.22 UIU/ML
WBC # FLD AUTO: 5.94 K/UL

## 2021-12-22 ENCOUNTER — APPOINTMENT (OUTPATIENT)
Dept: RHEUMATOLOGY | Facility: CLINIC | Age: 86
End: 2021-12-22
Payer: MEDICARE

## 2021-12-22 VITALS
OXYGEN SATURATION: 98 % | TEMPERATURE: 97.6 F | WEIGHT: 134 LBS | HEART RATE: 88 BPM | DIASTOLIC BLOOD PRESSURE: 70 MMHG | SYSTOLIC BLOOD PRESSURE: 122 MMHG | BODY MASS INDEX: 27.06 KG/M2

## 2021-12-22 DIAGNOSIS — R41.3 OTHER AMNESIA: ICD-10-CM

## 2021-12-22 PROCEDURE — 36415 COLL VENOUS BLD VENIPUNCTURE: CPT

## 2021-12-22 PROCEDURE — 96372 THER/PROPH/DIAG INJ SC/IM: CPT

## 2021-12-22 RX ORDER — DENOSUMAB 60 MG/ML
60 INJECTION SUBCUTANEOUS
Qty: 1 | Refills: 0 | Status: COMPLETED | OUTPATIENT
Start: 2021-12-22

## 2021-12-22 RX ADMIN — DENOSUMAB 0 MG/ML: 60 INJECTION SUBCUTANEOUS at 00:00

## 2021-12-23 LAB
25(OH)D3 SERPL-MCNC: 36.3 NG/ML
TSH SERPL-ACNC: 4.09 UIU/ML

## 2021-12-26 ENCOUNTER — MED ADMIN CHARGE (OUTPATIENT)
Age: 86
End: 2021-12-26

## 2021-12-26 RX ORDER — DENOSUMAB 60 MG/ML
60 INJECTION SUBCUTANEOUS
Qty: 1 | Refills: 0 | Status: COMPLETED | OUTPATIENT
Start: 2021-12-26

## 2021-12-26 RX ADMIN — DENOSUMAB 0 MG/ML: 60 INJECTION SUBCUTANEOUS at 00:00

## 2021-12-26 NOTE — PROCEDURE
[Today's Date:] : Date: [unfilled] [Risks] : risks [Benefits] : benefits [Alternatives] : alternatives [Consent Obtained] : written consent was obtained prior to the procedure and is detailed in the patient's record [Patient] : Prior to the start of the procedure a time out was taken and the identity of the patient was confirmed via name and date of birth with the patient. The correct site and the procedure to be performed were confirmed. The correct side was confirmed if applicable. The availability of the correct equipment was verified [Therapeutic] : therapeutic [#1 Site: ______] : #1 site identified in the [unfilled] [Alcohol] : alcohol [Tolerated Well] : the patient tolerated the procedure well [No Complications] : there were no complications [Instructions Given] : handouts/patient instructions were given to patient [Patient Instructed to Call] : patient was instructed to call if redness at site, a decrease in range of motion or an increase in pain is noted after procedure. [de-identified] : Prolia  [FreeTextEntry1] : monitor for flulike sx or allergic rxn, take APAP as needed for first 3 days, if flulike sx persist call office\par \par \par \par \par

## 2021-12-26 NOTE — ASSESSMENT
[FreeTextEntry1] : 89 yo wf , lives alone  w/ OP and diffuse OA, multisite, hypothyroidism, HLD\par Presents today, not sure why here.. too early for Prolia and issues throughout w/ memory asking same question several times- asked me 4 x what i gave her today... Prolia\par \par 1) Clinical Osteoporosis w/ HIgh risk for fracture:  Updated DExa 10/8/21 w/ most severe T score -2.1 and nearly 8% improvement over past study 2016 but Frax remains high with risk for hip fx at 4.7 %... given that she lives alone and has small stature, with advanced OA... still safest to continue Prolia long term.  Next dose 6/22/22 .  \par Missed treatment for over 1 year... resumed  June 2020.... \par Continues to be  under tremendous stress about  multiple family issues and now with memory issues as well.  Will continue Prolia x 2 ys then likely switch to Reclast depending on risk assessment at that time. \par  VFA completed 10/21 NO compression fractures.  \par -Was Exercising routinely- inconsistent at best now.  May need PT and activity tracker..  \par Continues w/  adequate  Ca/ Vit d intake updated labs needed now \par  + Risk factors:  small stature w/ BMI 24, one glass wine daily\par No previous fragility fractures,\par No hx renal calculi or radiation therapy\par Treatment:  \par - Prolia 12/17(1st dose)\par \par 2) OA multisite: stable, not active pain at this time except as noted regarding significant SS.. .  Overall despite stress doing fairly well\par Hands: classic OA changes w/ CMC squaring and intermittent discomfort tolerable \par LS:  w/ confirmed SS w/ pain worse w/ standing, feels better pushing cart- tolerates golf w/ minimal discomfort and intermittent radicular/ facet arthropathy w/ pain localized, stiffness < 10 mins radiating at times into L buttock- sometimes down to L foot.  chronic, but overall not active recently.. at times significant but responds to APAP, nothing stronger.  No previous interventional procedures or recent PT- will start now.. .. No overt weakness.. or bowel/ bladder incontinence, advised to call immediately if develops\par Discussion: \par Classic sx of SS tolerable at this point but would benefit from core strengthening to minimize impact and need for pharmacotherapy understood- now needs to focus on strengthening/ wt loss.. \par \par 3) Stress/ Situational depression and noted NEW Short term memory issues: on past few visits notable short term memory issues in this and last visit repeatedly asking same questions.  Today asked me 4 times what did i give her...\par  Working closely with family has appt with neuro in the New Year.  Started on lexapro per Dr. Shaver but didn't continue . \par \par Plan:\par - Continue q 6 m Prolia.. next dose 12/17/21 but ... updated dexa needed \par \par - Calcium and vit D:  needs updated measure w/ next labs.  \par \par Taking 8985-3740 mg Ca / 2000 IU vit daily between diet and supplements. Higher doses can cause kidney stones and contribut to atherosclerosis. The average diet contains 600-800 mg Calcium. \par \par - Vit D with goal mnt Vit D between 30-50 for optimal bone health usually requiring 2109-4365 IU daily supplement (hard to get enough in diet). \par Need 15-20 mins sunshine, unprotected daily for optimal Vit d from sun... \par \par - rto 6 m for Prolia.\par \par - call to son to discuss concerns about memory, should also f/u with Dr. Shaver\par \par - call if want to get MRI LS...

## 2021-12-26 NOTE — CONSULT LETTER
[Dear  ___] : Dear  [unfilled], [Courtesy Letter:] : I had the pleasure of seeing your patient, [unfilled], in my office today. [Consult Closing:] : Thank you very much for allowing me to participate in the care of this patient.  If you have any questions, please do not hesitate to contact me. [Sincerely,] : Sincerely, [FreeTextEntry2] : Luis Fernando Shaver MD  [FreeTextEntry1] : Please see below for concerns regarding short term memory problems- NEW...\par \par 89 yo wf , lives alone  w/ OP and diffuse OA, multisite, hypothyroidism, HLD\par Presents today, not sure why here.. too early for Prolia and issues throughout w/ memory asking same question several times, \par \par 1) Osteoporosis:  Updated DExa 6/20/19 w/ most severe T score -2.1 and nearly 8% improvement over past study 2016 but Frax remains high with risk for hip fx at 4.7 %... given that she lives alone and has small stature, with advanced OA... still safest to continue Prolia.  \par Having missed nearly 1 yr of tx it does make sense to repeat dxa to see if significant change in scores. \par Continues to be  under tremendous stress about  multiple family issues to make any change at this time.  Will continue Prolia x 2 ys then likely switch to Reclast depending on risk assessment at that time. \par  VFA not done- again unable to perform as no nl vertebra available for comparison. \par -Was Exercising routinely- in past but past few months struggling 2/2 husbands death and family issues, not consistent ... needs to resume, recommended PT and activity tracker..  \par Continues w/  adequate  Ca/ Vit d intake updated labs needed now \par  + Risk factors:  small stature w/ BMI 24, one glass wine daily\par No previous fragility fractures,\par No hx renal calculi or radiation therapy\par Treatment:  \par - Prolia 12/17(1st dose)\par \par 2) OA multisite: stable, not active pain at this time except as noted regarding significant SS.. .  Overall despite stress doing well\par Hands: classic OA changes w/ CMC squaring and intermittent discomfort tolerable \par LS:  w/ confirmed SS w/ pain worse w/ standing, feels better pushing cart- tolerates golf w/ minimal discomfort and intermittent radicular/ facet arthropathy w/ pain localized, stiffness < 10 mins radiating at times into L buttock- sometimes down to L foot.  chronic, but overall not active recently.. at times significant but responds to APAP, nothing stronger.  No previous interventional procedures or recent PT- will start now.. .. No overt weakness.. or bowel/ bladder incontinence, advised to call immediately if develops\par Discussion: \par Classic sx of SS tolerable at this point but would benefit from core strengthening to minimize impact and need for pharmacotherapy understood- now needs to focus on strengthening/ wt loss.. \par \par 3) Stress/ Situational depression and noted NEW Short term memory isssues: multiple issues, tolerated at this point with no complications... till now c/o memory issues.  \par   Working closely with family. Started on lexapro per Dr. Shaver, now sure if effective but didn't continue ... \par \par Plan:\par - Continue q 6 m Prolia.. next dose 12/17/21 but ... updated dexa needed \par \par - Given fluzone HD today  ..\par \par - Calcium and vit D:  needs updated measure w/ next labs.  \par \par - Repeat DXA  if minimal improvement over next study would suggest 24 hr urine for Ca)\par Ideally 30-45 mins moderate-intense wt bearing/ cardiovascular and strength training for optimal bone health \par \par Taking 6485-0922 mg Ca / 2000 IU vit daily between diet and supplements. Higher doses can cause kidney stones and contribut to atherosclerosis. The average diet contains 600-800 mg Calcium. \par \par - Vit D with goal mnt Vit D between 30-50 for optimal bone health usually requiring 2821-2403 IU daily supplement (hard to get enough in diet). \par Need 15-20 mins sunshine, unprotected daily for optimal Vit d from sun... \par \par - rto 3 m for Prolia.\par \par - call to son to discuss concerns about memory, should also f/u with Dr. Shaver\par \par - call if want to get MRI LS...  [FreeTextEntry3] : Geno Jones DNP, ANP-C\par Division or Rheumatology\par [facility]\par \par

## 2021-12-26 NOTE — PHYSICAL EXAM
[] : no respiratory distress [Auscultation Breath Sounds / Voice Sounds] : lungs were clear to auscultation bilaterally [Heart Rate And Rhythm] : heart rate was normal and rhythm regular [Heart Sounds] : normal S1 and S2 [Heart Sounds Gallop] : no gallops [Murmurs] : no murmurs [Heart Sounds Pericardial Friction Rub] : no pericardial rub [No CVA Tenderness] : no ~M costovertebral angle tenderness [No Spinal Tenderness] : no spinal tenderness [FreeTextEntry1] : diffuse paraspinal muscle pain throughout lumbar region- mild

## 2022-01-02 LAB
THYROGLOB AB SERPL-ACNC: <20 IU/ML
THYROPEROXIDASE AB SERPL IA-ACNC: <10 IU/ML

## 2022-01-07 ENCOUNTER — APPOINTMENT (OUTPATIENT)
Dept: NEUROLOGY | Facility: CLINIC | Age: 87
End: 2022-01-07

## 2022-01-14 ENCOUNTER — APPOINTMENT (OUTPATIENT)
Dept: NEUROLOGY | Facility: CLINIC | Age: 87
End: 2022-01-14
Payer: MEDICARE

## 2022-01-14 ENCOUNTER — NON-APPOINTMENT (OUTPATIENT)
Age: 87
End: 2022-01-14

## 2022-01-14 VITALS
DIASTOLIC BLOOD PRESSURE: 77 MMHG | WEIGHT: 120 LBS | HEIGHT: 59 IN | BODY MASS INDEX: 24.19 KG/M2 | HEART RATE: 92 BPM | TEMPERATURE: 97.8 F | SYSTOLIC BLOOD PRESSURE: 145 MMHG

## 2022-01-14 DIAGNOSIS — Z78.9 OTHER SPECIFIED HEALTH STATUS: ICD-10-CM

## 2022-01-14 DIAGNOSIS — Z87.891 PERSONAL HISTORY OF NICOTINE DEPENDENCE: ICD-10-CM

## 2022-01-14 PROCEDURE — 99204 OFFICE O/P NEW MOD 45 MIN: CPT

## 2022-01-14 NOTE — PHYSICAL EXAM
[___ / 5] : Visuospatial / Executive: [unfilled] / 5 [0 / 0] : Memory: 0 / 0 [___ / 3] : Attention (Serial 7 subtraction): [unfilled] / 3 [___ / 1] : Fluency: [unfilled] / 1 [___ / 2] : Abstraction: [unfilled] / 2 [___ / 5] : Delayed Recall: [unfilled] / 5 [___ / 6] : Orientation: [unfilled] / 6 [FreeTextEntry1] : Examination:\par Constitutional: normal, no apparent distress\par oropharynx: low lying palate\par Eyes: normal conjunctiva b/l, no ptosis, visual fields full\par Respiratory: no respiratory distress, normal effort, normal auscultation\par Cardiovascular: normal rate, rhythm, no murmurs\par Neck: supple, no masses\par Vascular: carotids normal\par Skin: normal color, no rashes\par Psych: normal mood, affect\par \par Neurological:\par Memory: MoCA score 21/30\par Language intact/no aphasia\par Cranial Nerves: II-XII intact, Pupils equally round and reactive to light, ocular muscles/movements intact, no ptosis, no facial weakness, tongue protrudes normally in the midline, \par Motor: normal tone, no pronator drift, full strength in all four extremities in the proximal and distal muscle groups\par Coordination: Fine motor movements intact, rapid alternating movements intact, finger to nose intact bilaterally\par Sensory: intact to light touch, joint position sense, slightly decreased vibration in left foot\par DTRs: symmetric, 2+ in b/l triceps, 2+ in b/l biceps, 2+ in b/l brachioradialis, 2+ in bilateral patellars, trace in bilateral Achilles, Babinskis negative bilaterally\par Gait: narrow based, steady\par \par  [MocaTotal] : 21

## 2022-01-14 NOTE — HISTORY OF PRESENT ILLNESS
[FreeTextEntry1] : Mrs. Stoll is here today for neurology evaluation.\par She is accompanied by her son, Dr. Stoll.\par \par At her last two rheumatology appointments, Geno Jones was concerned that she was more confused.\par \par She lives alone.\par She is independent in her ADLs.\par She denies getting lost in familiar laces.\par Her son has been helping with her bills.\par She says that she takes her medications regularly.\par \par She takes escitalopram for depression after her  passed away.\par She does not notice any major improvement.\par \par She denies any difficulty with gait or falls.\par She denies having any urinary incontinence.\par \par She sleeps fairly well.\par She sometimes wakes up in the middle of the night.\par She feels well rested upon awakening.\par She does not nap during the day.\par She is not sure if she snores.\par \par She had a recent cardiac workup with Dr. Jasmyn Andrews.\par \par \par

## 2022-01-14 NOTE — DISCUSSION/SUMMARY
[FreeTextEntry1] : Mrs. Stoll is a 89 year old woman referred for memory impairment.\par She scored 21/30 on the [place] Cognitive Assessment suggestive of mild dementia. However, she does appear to be functioning reasonably well at home and can take care of her basic needs at this time.\par The physical portion of her examination is unremarkable and I do not think that an MRI brain would  at this time.\par We can check a vitamin B12 level with her next set of blood tests.\par She does have a low lying palate and believes that she might snore.  However, she would not be amenable to treating sleep apnea at this time. \par There is a an issue of depression and she has not been taking escitalopram regularly.\par She will start to take this regularly. \par \par We discussed the importance of staying mentally and physically active.\par We also discussed the importance of good sleep and healthy (Mediterranean) diet.\par Strategies such as a weekly pill box and reminders should be used to compensate for memory loss.\par \par At this time there is no medication that is found to reverse dementia. However, some medications may slow the progression.\par I will prescribe Donepezil 5 mg qhs.\par Potential side effects discussed. If well tolerated after one month the dose can be increased to 10 mg.\par \par She and her family will start to discuss next steps in case there is decline and she can no longer live independently.\par \par I will have Ms. Stoll follow up in 3-4 months, sooner if needed.

## 2022-01-14 NOTE — CONSULT LETTER
[Dear  ___] : Dear  [unfilled], [Consult Letter:] : I had the pleasure of evaluating your patient, [unfilled]. [Please see my note below.] : Please see my note below. [Consult Closing:] : Thank you very much for allowing me to participate in the care of this patient.  If you have any questions, please do not hesitate to contact me. [FreeTextEntry2] : Luis Fernando Shaver [FreeTextEntry3] : Sincerely,\par \par \par Nancy Lima MD\par Diplomate, American Academy of Psychiatry and Neurology\par Board Certified in the Subspecialty of Clinical Neurophysiology\par Board Certified in the Subspecialty of Sleep Medicine\par Board Certified in the Subspecialty of Epilepsy\par

## 2022-01-27 ENCOUNTER — APPOINTMENT (OUTPATIENT)
Dept: RHEUMATOLOGY | Facility: CLINIC | Age: 87
End: 2022-01-27

## 2022-03-25 ENCOUNTER — APPOINTMENT (OUTPATIENT)
Dept: NEUROLOGY | Facility: CLINIC | Age: 87
End: 2022-03-25
Payer: MEDICARE

## 2022-03-25 VITALS
WEIGHT: 118 LBS | BODY MASS INDEX: 23.16 KG/M2 | HEIGHT: 60 IN | HEART RATE: 96 BPM | SYSTOLIC BLOOD PRESSURE: 144 MMHG | TEMPERATURE: 97.9 F | DIASTOLIC BLOOD PRESSURE: 70 MMHG

## 2022-03-25 PROCEDURE — 99214 OFFICE O/P EST MOD 30 MIN: CPT

## 2022-03-25 NOTE — PHYSICAL EXAM
[___ / 5] : Visuospatial / Executive: [unfilled] / 5 [0 / 0] : Memory: 0 / 0 [___ / 3] : Attention (Serial 7 subtraction): [unfilled] / 3 [___ / 1] : Fluency: [unfilled] / 1 [___ / 2] : Abstraction: [unfilled] / 2 [___ / 5] : Delayed Recall: [unfilled] / 5 [___ / 6] : Orientation: [unfilled] / 6 [FreeTextEntry1] : Examination:\par Constitutional: normal, no apparent distress\par Eyes: normal conjunctiva b/l, no ptosis, visual fields full\par Respiratory: no respiratory distress, normal effort, normal auscultation\par Cardiovascular: normal rate, rhythm, no murmurs\par Neck: supple, no masses\par Vascular: carotids normal\par Skin: normal color, no rashes\par Psych: normal mood, affect\par \par Neurological:\par Memory: oriented to person, place. Knows day of week, month and year but not exact date,  Recalled 3/3 words after 3 minutes. Scored 18/30 on the MoCA\par Language intact/no aphasia\par Cranial Nerves: II-XII intact, Pupils equally round and reactive to light, ocular muscles/movements intact, no ptosis, no facial weakness, tongue protrudes normally in the midline, \par Motor: normal tone, no pronator drift, full strength in all four extremities in the proximal and distal muscle groups\par Coordination: Fine motor movements intact, rapid alternating movements intact, finger to nose intact bilaterally\par Sensory: intact to light touch\par DTRs: symmetric, normal\par Gait: narrow based, steady\par  [MocaTotal] : 18

## 2022-03-25 NOTE — HISTORY OF PRESENT ILLNESS
[FreeTextEntry1] : Initial visit 1/14/22.\par \par Mrs. Stoll is here today for neurology evaluation.\par She is accompanied by her son, Dr. Stoll.\par \par At her last two rheumatology appointments, Geno Jones was concerned that she was more confused.\par \par She lives alone.\par She is independent in her ADLs.\par She denies getting lost in familiar laces.\par Her son has been helping with her bills.\par She says that she takes her medications regularly.\par \par She takes escitalopram for depression after her  passed away.\par She does not notice any major improvement.\par \par She denies any difficulty with gait or falls.\par She denies having any urinary incontinence.\par \par She sleeps fairly well.\par She sometimes wakes up in the middle of the night.\par She feels well rested upon awakening.\par She does not nap during the day.\par She is not sure if she snores.\par \par She had a recent cardiac workup with Dr. Jasmyn Andrews.\par \par Interval History:\par 3/25/22:\par She is here today with her son.\par She is unaware of any new changes.\par She is independent in her ADLs.\par Her family sets up a weekly pill box. She occasionally misses a dose.\par She has been taking Donepezil and denies any side effects.\par She is sleeping well. She does wake up between 3-4 AM and then falls back to sleep.\par She takes walks with neighbors.\par She drives locally.\par \par \par \par \par

## 2022-03-25 NOTE — DISCUSSION/SUMMARY
[FreeTextEntry1] : Mrs. Stoll is a 89 year old woman referred for memory impairment.\par She scored 21/30 on the [place] Cognitive Assessment at her initial visit suggestive of mild dementia. \par Today, 3/25, she scored 18/30.\par However, she does appear to be functioning reasonably well at home and can take care of her basic needs at this time.\par The physical portion of her examination is unremarkable.\par \par -Check vitamin B12 level\par \par -Continue donepezil 5 mg. After she returns from [place] for her granddaughter's wedding she will increase donepezil to 10 mg.\par \par -Continue escitalopram for depression.\par \par -We again discussed the importance of staying mentally and physically active and the importance of good sleep and healthy diet.\par \par At this time she is managing to live independently with support from her family.\par Family will continue to monitor the situation.\par \par f/u 4-6 months, sooner if needed. \par \par

## 2022-05-17 ENCOUNTER — EMERGENCY (EMERGENCY)
Facility: HOSPITAL | Age: 87
LOS: 0 days | Discharge: HOME | End: 2022-05-17
Attending: EMERGENCY MEDICINE
Payer: MEDICARE

## 2022-05-17 VITALS
HEART RATE: 81 BPM | DIASTOLIC BLOOD PRESSURE: 53 MMHG | HEIGHT: 69 IN | OXYGEN SATURATION: 98 % | RESPIRATION RATE: 18 BRPM | TEMPERATURE: 98 F | SYSTOLIC BLOOD PRESSURE: 158 MMHG

## 2022-05-17 VITALS
OXYGEN SATURATION: 98 % | DIASTOLIC BLOOD PRESSURE: 62 MMHG | RESPIRATION RATE: 16 BRPM | HEART RATE: 84 BPM | TEMPERATURE: 98 F | SYSTOLIC BLOOD PRESSURE: 124 MMHG

## 2022-05-17 DIAGNOSIS — Z79.82 LONG TERM (CURRENT) USE OF ASPIRIN: ICD-10-CM

## 2022-05-17 DIAGNOSIS — Z98.890 OTHER SPECIFIED POSTPROCEDURAL STATES: Chronic | ICD-10-CM

## 2022-05-17 DIAGNOSIS — Z96.653 PRESENCE OF ARTIFICIAL KNEE JOINT, BILATERAL: Chronic | ICD-10-CM

## 2022-05-17 DIAGNOSIS — K80.20 CALCULUS OF GALLBLADDER WITHOUT CHOLECYSTITIS WITHOUT OBSTRUCTION: ICD-10-CM

## 2022-05-17 DIAGNOSIS — E78.5 HYPERLIPIDEMIA, UNSPECIFIED: ICD-10-CM

## 2022-05-17 DIAGNOSIS — Z20.822 CONTACT WITH AND (SUSPECTED) EXPOSURE TO COVID-19: ICD-10-CM

## 2022-05-17 DIAGNOSIS — R11.0 NAUSEA: ICD-10-CM

## 2022-05-17 DIAGNOSIS — Z88.1 ALLERGY STATUS TO OTHER ANTIBIOTIC AGENTS STATUS: ICD-10-CM

## 2022-05-17 DIAGNOSIS — R53.81 OTHER MALAISE: ICD-10-CM

## 2022-05-17 DIAGNOSIS — Z91.011 ALLERGY TO MILK PRODUCTS: ICD-10-CM

## 2022-05-17 DIAGNOSIS — R06.02 SHORTNESS OF BREATH: ICD-10-CM

## 2022-05-17 DIAGNOSIS — R11.2 NAUSEA WITH VOMITING, UNSPECIFIED: ICD-10-CM

## 2022-05-17 LAB
ALBUMIN SERPL ELPH-MCNC: 3.8 G/DL — SIGNIFICANT CHANGE UP (ref 3.3–5)
ALP SERPL-CCNC: 40 U/L — SIGNIFICANT CHANGE UP (ref 40–120)
ALT FLD-CCNC: 27 U/L — SIGNIFICANT CHANGE UP (ref 12–78)
ANION GAP SERPL CALC-SCNC: 10 MMOL/L — SIGNIFICANT CHANGE UP (ref 5–17)
APPEARANCE UR: CLEAR — SIGNIFICANT CHANGE UP
AST SERPL-CCNC: 19 U/L — SIGNIFICANT CHANGE UP (ref 15–37)
BASOPHILS # BLD AUTO: 0.07 K/UL — SIGNIFICANT CHANGE UP (ref 0–0.2)
BASOPHILS NFR BLD AUTO: 0.6 % — SIGNIFICANT CHANGE UP (ref 0–2)
BILIRUB SERPL-MCNC: 0.5 MG/DL — SIGNIFICANT CHANGE UP (ref 0.2–1.2)
BILIRUB UR-MCNC: NEGATIVE — SIGNIFICANT CHANGE UP
BUN SERPL-MCNC: 21 MG/DL — SIGNIFICANT CHANGE UP (ref 7–23)
CALCIUM SERPL-MCNC: 9.1 MG/DL — SIGNIFICANT CHANGE UP (ref 8.5–10.1)
CHLORIDE SERPL-SCNC: 105 MMOL/L — SIGNIFICANT CHANGE UP (ref 96–108)
CO2 SERPL-SCNC: 23 MMOL/L — SIGNIFICANT CHANGE UP (ref 22–31)
COLOR SPEC: YELLOW — SIGNIFICANT CHANGE UP
CREAT SERPL-MCNC: 0.83 MG/DL — SIGNIFICANT CHANGE UP (ref 0.5–1.3)
DIFF PNL FLD: ABNORMAL
EGFR: 67 ML/MIN/1.73M2 — SIGNIFICANT CHANGE UP
EOSINOPHIL # BLD AUTO: 0.15 K/UL — SIGNIFICANT CHANGE UP (ref 0–0.5)
EOSINOPHIL NFR BLD AUTO: 1.3 % — SIGNIFICANT CHANGE UP (ref 0–6)
GLUCOSE SERPL-MCNC: 197 MG/DL — HIGH (ref 70–99)
GLUCOSE UR QL: NEGATIVE — SIGNIFICANT CHANGE UP
HCT VFR BLD CALC: 40.8 % — SIGNIFICANT CHANGE UP (ref 34.5–45)
HGB BLD-MCNC: 13.7 G/DL — SIGNIFICANT CHANGE UP (ref 11.5–15.5)
IMM GRANULOCYTES NFR BLD AUTO: 0.7 % — SIGNIFICANT CHANGE UP (ref 0–1.5)
KETONES UR-MCNC: ABNORMAL
LEUKOCYTE ESTERASE UR-ACNC: ABNORMAL
LYMPHOCYTES # BLD AUTO: 1.53 K/UL — SIGNIFICANT CHANGE UP (ref 1–3.3)
LYMPHOCYTES # BLD AUTO: 12.8 % — LOW (ref 13–44)
MAGNESIUM SERPL-MCNC: 2.4 MG/DL — SIGNIFICANT CHANGE UP (ref 1.6–2.6)
MCHC RBC-ENTMCNC: 31.1 PG — SIGNIFICANT CHANGE UP (ref 27–34)
MCHC RBC-ENTMCNC: 33.6 GM/DL — SIGNIFICANT CHANGE UP (ref 32–36)
MCV RBC AUTO: 92.5 FL — SIGNIFICANT CHANGE UP (ref 80–100)
MONOCYTES # BLD AUTO: 0.63 K/UL — SIGNIFICANT CHANGE UP (ref 0–0.9)
MONOCYTES NFR BLD AUTO: 5.3 % — SIGNIFICANT CHANGE UP (ref 2–14)
NEUTROPHILS # BLD AUTO: 9.53 K/UL — HIGH (ref 1.8–7.4)
NEUTROPHILS NFR BLD AUTO: 79.3 % — HIGH (ref 43–77)
NITRITE UR-MCNC: NEGATIVE — SIGNIFICANT CHANGE UP
PH UR: 6 — SIGNIFICANT CHANGE UP (ref 5–8)
PLATELET # BLD AUTO: 287 K/UL — SIGNIFICANT CHANGE UP (ref 150–400)
POTASSIUM SERPL-MCNC: 3.4 MMOL/L — LOW (ref 3.5–5.3)
POTASSIUM SERPL-SCNC: 3.4 MMOL/L — LOW (ref 3.5–5.3)
PROT SERPL-MCNC: 7.3 GM/DL — SIGNIFICANT CHANGE UP (ref 6–8.3)
PROT UR-MCNC: NEGATIVE — SIGNIFICANT CHANGE UP
RAPID RVP RESULT: SIGNIFICANT CHANGE UP
RBC # BLD: 4.41 M/UL — SIGNIFICANT CHANGE UP (ref 3.8–5.2)
RBC # FLD: 14.3 % — SIGNIFICANT CHANGE UP (ref 10.3–14.5)
SARS-COV-2 RNA SPEC QL NAA+PROBE: SIGNIFICANT CHANGE UP
SODIUM SERPL-SCNC: 138 MMOL/L — SIGNIFICANT CHANGE UP (ref 135–145)
SP GR SPEC: 1.02 — SIGNIFICANT CHANGE UP (ref 1.01–1.02)
TROPONIN I, HIGH SENSITIVITY RESULT: 6.35 NG/L — SIGNIFICANT CHANGE UP
TROPONIN I, HIGH SENSITIVITY RESULT: 8.64 NG/L — SIGNIFICANT CHANGE UP
UROBILINOGEN FLD QL: NEGATIVE — SIGNIFICANT CHANGE UP
WBC # BLD: 11.99 K/UL — HIGH (ref 3.8–10.5)
WBC # FLD AUTO: 11.99 K/UL — HIGH (ref 3.8–10.5)

## 2022-05-17 PROCEDURE — 99285 EMERGENCY DEPT VISIT HI MDM: CPT

## 2022-05-17 PROCEDURE — 99285 EMERGENCY DEPT VISIT HI MDM: CPT | Mod: 25

## 2022-05-17 PROCEDURE — 81001 URINALYSIS AUTO W/SCOPE: CPT

## 2022-05-17 PROCEDURE — 83735 ASSAY OF MAGNESIUM: CPT

## 2022-05-17 PROCEDURE — 84484 ASSAY OF TROPONIN QUANT: CPT | Mod: 91

## 2022-05-17 PROCEDURE — 93005 ELECTROCARDIOGRAM TRACING: CPT

## 2022-05-17 PROCEDURE — 93010 ELECTROCARDIOGRAM REPORT: CPT

## 2022-05-17 PROCEDURE — 71045 X-RAY EXAM CHEST 1 VIEW: CPT | Mod: 26

## 2022-05-17 PROCEDURE — 36415 COLL VENOUS BLD VENIPUNCTURE: CPT

## 2022-05-17 PROCEDURE — 0225U NFCT DS DNA&RNA 21 SARSCOV2: CPT

## 2022-05-17 PROCEDURE — 85025 COMPLETE CBC W/AUTO DIFF WBC: CPT

## 2022-05-17 PROCEDURE — 80053 COMPREHEN METABOLIC PANEL: CPT

## 2022-05-17 PROCEDURE — 71045 X-RAY EXAM CHEST 1 VIEW: CPT

## 2022-05-17 PROCEDURE — 87086 URINE CULTURE/COLONY COUNT: CPT

## 2022-05-17 PROCEDURE — 70450 CT HEAD/BRAIN W/O DYE: CPT | Mod: MA

## 2022-05-17 PROCEDURE — 70450 CT HEAD/BRAIN W/O DYE: CPT | Mod: 26,MA

## 2022-05-17 PROCEDURE — 87186 SC STD MICRODIL/AGAR DIL: CPT

## 2022-05-17 RX ORDER — SODIUM CHLORIDE 9 MG/ML
1000 INJECTION INTRAMUSCULAR; INTRAVENOUS; SUBCUTANEOUS ONCE
Refills: 0 | Status: COMPLETED | OUTPATIENT
Start: 2022-05-17 | End: 2022-05-17

## 2022-05-17 RX ADMIN — SODIUM CHLORIDE 2000 MILLILITER(S): 9 INJECTION INTRAMUSCULAR; INTRAVENOUS; SUBCUTANEOUS at 14:57

## 2022-05-17 NOTE — ED ADULT NURSE NOTE - OBJECTIVE STATEMENT
patient brought in by EMS c/o shortness of breath.  patient reports SOB started this morning.  also c/o nausea, given 4 mg IV zofran by EMS with improvement.  also given 4 baby ASA.  denies chest pain.  reports recent exposure to  COVID+ family member.

## 2022-05-17 NOTE — ED PROVIDER NOTE - OBJECTIVE STATEMENT
89 y/o female with a PMHx of gall stones, HLD presents to the ED c/o SOB and nausea starting today. Pt given 81mg ASA x4 and 4mg IV Zofran PTA. Denies CP, fevers. Family member COVID+. Vaccinated for COVID. No other complaints at this time. PCP: Dr. Luis Fernando Shaver.

## 2022-05-17 NOTE — ED PROVIDER NOTE - PATIENT PORTAL LINK FT
You can access the FollowMyHealth Patient Portal offered by [facility] by registering at the following website: [email or web address]. By joining [employer]’s FollowMyHealth portal, you will also be able to view your health information using other applications (apps) compatible with our system.

## 2022-05-17 NOTE — ED ADULT TRIAGE NOTE - CHIEF COMPLAINT QUOTE
Pt arrives to ED from home complaining of nausea and shortness of breath starting today. Pt given 4 baby aspirin, 4 mg IV zofran prior to arrival.

## 2022-05-17 NOTE — ED ADULT NURSE REASSESSMENT NOTE - NS ED NURSE REASSESS COMMENT FT1
Pt passed ambulation trial. Ambulating steadily. Denies any complaints. MD Suero aware. Plan for discharge.

## 2022-05-17 NOTE — ED PROVIDER NOTE - PROGRESS NOTE DETAILS
pt feeling much better.  carmen PO.  ambulating without difficulty. would like to go home. ED evaluation and management discussed with the patient and family (if available) in detail.  Close PMD follow up encouraged.  Strict ED return instructions discussed in detail and patient given the opportunity to ask any questions about their discharge diagnosis and instructions. Patient verbalized understanding.

## 2022-05-17 NOTE — ED PROVIDER NOTE - NSFOLLOWUPINSTRUCTIONS_ED_ALL_ED_FT
Plenty of rest  plenty of fluids  Continue your medications as prescribed.  Anything worsens or persists, return to ER for further care and evaluation.

## 2022-05-17 NOTE — ED PROVIDER NOTE - NSICDXPASTSURGICALHX_GEN_ALL_CORE_FT
PAST SURGICAL HISTORY:  H/O hernia repair 2014    History of knee replacement, total, bilateral 2016

## 2022-06-24 ENCOUNTER — APPOINTMENT (OUTPATIENT)
Dept: RHEUMATOLOGY | Facility: CLINIC | Age: 87
End: 2022-06-24
Payer: MEDICARE

## 2022-06-24 VITALS
OXYGEN SATURATION: 98 % | WEIGHT: 132 LBS | SYSTOLIC BLOOD PRESSURE: 130 MMHG | BODY MASS INDEX: 25.91 KG/M2 | HEIGHT: 60 IN | DIASTOLIC BLOOD PRESSURE: 70 MMHG | HEART RATE: 97 BPM | TEMPERATURE: 97.3 F

## 2022-06-24 PROCEDURE — 36415 COLL VENOUS BLD VENIPUNCTURE: CPT

## 2022-06-24 PROCEDURE — 96372 THER/PROPH/DIAG INJ SC/IM: CPT

## 2022-06-26 ENCOUNTER — MED ADMIN CHARGE (OUTPATIENT)
Age: 87
End: 2022-06-26

## 2022-06-26 RX ORDER — DENOSUMAB 60 MG/ML
60 INJECTION SUBCUTANEOUS
Qty: 1 | Refills: 0 | Status: COMPLETED | OUTPATIENT
Start: 2022-06-26

## 2022-06-26 RX ORDER — DONEPEZIL HYDROCHLORIDE 10 MG/1
10 TABLET ORAL
Qty: 30 | Refills: 5 | Status: DISCONTINUED | COMMUNITY
Start: 2022-01-14 | End: 2022-06-26

## 2022-06-26 RX ADMIN — DENOSUMAB 0 MG/ML: 60 INJECTION SUBCUTANEOUS at 00:00

## 2022-06-26 NOTE — PROCEDURE
[Today's Date:] : Date: [unfilled] [Risks] : risks [Benefits] : benefits [Alternatives] : alternatives [Consent Obtained] : written consent was obtained prior to the procedure and is detailed in the patient's record [Patient] : Prior to the start of the procedure a time out was taken and the identity of the patient was confirmed via name and date of birth with the patient. The correct site and the procedure to be performed were confirmed. The correct side was confirmed if applicable. The availability of the correct equipment was verified [Therapeutic] : therapeutic [#1 Site: ______] : #1 site identified in the [unfilled] [Alcohol] : alcohol [Tolerated Well] : the patient tolerated the procedure well [No Complications] : there were no complications [Instructions Given] : handouts/patient instructions were given to patient [Patient Instructed to Call] : patient was instructed to call if redness at site, a decrease in range of motion or an increase in pain is noted after procedure. [de-identified] : Prolia  [FreeTextEntry1] : monitor for flulike sx or allergic rxn, take APAP as needed for first 3 days, if flulike sx persist call office\par \par \par \par \par

## 2022-06-26 NOTE — ASSESSMENT
[FreeTextEntry1] : 89 yo wf , lives alone  w/ OP and diffuse OA, multisite, hypothyroidism, HLD and early dementia \par Presents today, not sure why here..\par \par 1) Clinical Osteoporosis w/ HIgh risk for fracture:  Updated DExa 10/8/21 w/ most severe T score -2.1 and nearly 8% improvement over past study 2016 but Frax remains high with risk for hip fx at 4.7 %... given that she lives alone and has small stature, with advanced OA and now progressive dementia, unsteady gait and high risk for fall... still safest to continue Prolia long term.  Next dose 12/24/22 \par Missed treatment for over 1 year... resumed  June 2020.... \par  Will continue Prolia x 2 ys then likely switch to Reclast depending on risk assessment at that time. \par  VFA completed 10/21 NO compression fractures.  \par -Reports routine walking.   \par Continues w/  adequate  Ca/ Vit d intake updated labs needed now \par  + Risk factors:  small stature w/ BMI 24, one glass wine daily, and unsteady gait\par No previous fragility fractures,\par No hx renal calculi or radiation therapy\par Treatment:  \par - Prolia 12/17(1st dose)\par \par 2) OA multisite: stable, not active pain at this time except as noted regarding significant SS.. .  Overall despite stress doing fairly well\par Hands: classic OA changes w/ CMC squaring and intermittent discomfort tolerable \par LS:  w/ confirmed SS w/ pain worse w/ standing, feels better pushing cart- tolerates golf w/ minimal discomfort and intermittent radicular/ facet arthropathy w/ pain localized, stiffness < 10 mins radiating at times into L buttock- sometimes down to L foot.  chronic, but overall not active recently.. at times significant but responds to APAP, nothing stronger.  No previous interventional procedures or recent PT-  No overt weakness.. or bowel/ bladder incontinence, advised to call immediately if develops\par Discussion: \par Classic sx of SS tolerable at this point but would benefit from core strengthening to minimize impact and need for pharmacotherapy understood- now needs to focus on strengthening/ wt loss.. no change at this point\par \par 3) Stress/ Situational depression and noted progressive Short term memory issues: on past few visits notable short term memory issues in this and last visit repeatedly asking same questions.  Today asked me 4 times what did i give her... and continues to have trouble remembering 3 items during visit.. now on Donepezil.. and continues low dose lexapro.  Here today with son Sunny.  \par  Working closely with family and will be switching to Dr. Strong when Dr. Shaver retires \par \par Plan:\par - Continue q 6 m Prolia.. next dose 12/26/22 \par \par - Calcium and vit D:  needs updated measure w/ next labs.  \par \par Taking 5373-1960 mg Ca / 2000 IU vit daily between diet and supplements. Higher doses can cause kidney stones and contribut to atherosclerosis. The average diet contains 600-800 mg Calcium. \par \par - Vit D with goal mnt Vit D between 30-50 for optimal bone health usually requiring 5257-5699 IU daily supplement (hard to get enough in diet). \par Need 15-20 mins sunshine, unprotected daily for optimal Vit d from sun... .\par \par - call if want to get MRI LS...

## 2022-07-04 LAB
25(OH)D3 SERPL-MCNC: 32.7 NG/ML
ALBUMIN SERPL ELPH-MCNC: 4.5 G/DL
ALP BLD-CCNC: 44 U/L
ALT SERPL-CCNC: 16 U/L
ANION GAP SERPL CALC-SCNC: 12 MMOL/L
AST SERPL-CCNC: 18 U/L
BASOPHILS # BLD AUTO: 0.07 K/UL
BASOPHILS NFR BLD AUTO: 1 %
BILIRUB SERPL-MCNC: 0.2 MG/DL
BUN SERPL-MCNC: 25 MG/DL
CALCIUM SERPL-MCNC: 9.4 MG/DL
CALCIUM SERPL-MCNC: 9.4 MG/DL
CHLORIDE SERPL-SCNC: 107 MMOL/L
CO2 SERPL-SCNC: 24 MMOL/L
CREAT SERPL-MCNC: 0.76 MG/DL
CRP SERPL-MCNC: 5 MG/L
EGFR: 74 ML/MIN/1.73M2
EOSINOPHIL # BLD AUTO: 0.34 K/UL
EOSINOPHIL NFR BLD AUTO: 5 %
GLUCOSE SERPL-MCNC: 81 MG/DL
HCT VFR BLD CALC: 41.4 %
HGB BLD-MCNC: 13.5 G/DL
IMM GRANULOCYTES NFR BLD AUTO: 0.4 %
LYMPHOCYTES # BLD AUTO: 1.77 K/UL
LYMPHOCYTES NFR BLD AUTO: 25.8 %
MAN DIFF?: NORMAL
MCHC RBC-ENTMCNC: 31.3 PG
MCHC RBC-ENTMCNC: 32.6 GM/DL
MCV RBC AUTO: 95.8 FL
MONOCYTES # BLD AUTO: 0.83 K/UL
MONOCYTES NFR BLD AUTO: 12.1 %
NEUTROPHILS # BLD AUTO: 3.81 K/UL
NEUTROPHILS NFR BLD AUTO: 55.7 %
PARATHYROID HORMONE INTACT: 24 PG/ML
PLATELET # BLD AUTO: 287 K/UL
POTASSIUM SERPL-SCNC: 4.6 MMOL/L
PROT SERPL-MCNC: 6.9 G/DL
RBC # BLD: 4.32 M/UL
RBC # FLD: 14.6 %
SODIUM SERPL-SCNC: 143 MMOL/L
TSH SERPL-ACNC: 4.29 UIU/ML
WBC # FLD AUTO: 6.85 K/UL

## 2022-09-09 ENCOUNTER — APPOINTMENT (OUTPATIENT)
Dept: NEUROLOGY | Facility: CLINIC | Age: 87
End: 2022-09-09

## 2022-09-09 VITALS
WEIGHT: 118 LBS | HEART RATE: 84 BPM | TEMPERATURE: 97.8 F | BODY MASS INDEX: 23.16 KG/M2 | DIASTOLIC BLOOD PRESSURE: 75 MMHG | HEIGHT: 60 IN | SYSTOLIC BLOOD PRESSURE: 136 MMHG

## 2022-09-09 PROCEDURE — 99213 OFFICE O/P EST LOW 20 MIN: CPT

## 2022-09-09 RX ORDER — DONEPEZIL HYDROCHLORIDE 5 MG/1
5 TABLET ORAL
Qty: 30 | Refills: 0 | Status: DISCONTINUED | COMMUNITY
Start: 2022-01-14 | End: 2022-09-09

## 2022-09-09 NOTE — HISTORY OF PRESENT ILLNESS
[FreeTextEntry1] : Initial visit 1/14/22.\par \par Mrs. Stoll is here today for neurology evaluation.\par She is accompanied by her son, Dr. Stoll.\par \par At her last two rheumatology appointments, Geno Jones was concerned that she was more confused.\par \par She lives alone.\par She is independent in her ADLs.\par She denies getting lost in familiar laces.\par Her son has been helping with her bills.\par She says that she takes her medications regularly.\par \par She takes escitalopram for depression after her  passed away.\par She does not notice any major improvement.\par \par She denies any difficulty with gait or falls.\par She denies having any urinary incontinence.\par \par She sleeps fairly well.\par She sometimes wakes up in the middle of the night.\par She feels well rested upon awakening.\par She does not nap during the day.\par She is not sure if she snores.\par \par She had a recent cardiac workup with Dr. Jasmyn Andrews.\par \par Interval History:\par 3/25/22:\par She is here today with her son.\par She is unaware of any new changes.\par She is independent in her ADLs.\par Her family sets up a weekly pill box. She occasionally misses a dose.\par She has been taking Donepezil and denies any side effects.\par She is sleeping well. She does wake up between 3-4 AM and then falls back to sleep.\par She takes walks with neighbors.\par She drives locally.\par \par \par 9/9/22:\par She is here today with her daughter Jimenez.\par She feels as if she is slowing down.\par Her daughter does feel as if short term memory is getting worse. They have spent the last two weeks together. She repeats herself frequently.\par Her son did raise concerns about depression and escitalopram was increased from 5 to 10 mg. She increased the dose about two weeks ago.\par She has recently started to take all of her medications in the morning to help with remembering/compliance. \par She drives locally - to [employer] and to the supermarket.\par She is independent in her ADLs.\par She has not had any falls.\par \par

## 2022-09-09 NOTE — DISCUSSION/SUMMARY
[FreeTextEntry1] : Mrs. Stoll is a 89 year old woman referred for memory impairment.\par She scored 21/30 on the [place] Cognitive Assessment at her initial visit suggestive of mild dementia. \par On 3/25, she scored 18/30. Today, 9/9 she again scored 18/30.\par \par Her family reports increased short term memory impairment but she continues to be independent in her ADLs and is functioning at home with some support. \par \par \par The physical portion of her examination is unremarkable.\par \par -Check vitamin B12 level at next visit. She is scheduled to see Dr. Strong next month. \par \par -Continue donepezil 10 mg. Initially she may not have been taking this consistently. She is now more consistent. If memory continues to decline, can add memantine. \par \par -Continue escitalopram for depression. The dose was increased about two weeks ago from 5-10 mg.\par \par -Medications moved to AM only to help with compliance. She will continue to use a pill box and family will continue to remind her about medications. \par \par -We again discussed the importance of staying mentally and physically active and the importance of good sleep and healthy diet.\par -Discussed local activities that she can participate in - senior center, local library.\par She will continue to socialize with neighbors and participate in her book club.\par \par -Family is looking into some meal delivery services as well as moving laundry from the basement to the primary bathroom.\par \par -Driving will be limited to local driving during the day. \par \par f/u 4-6 months, sooner if needed. \par

## 2022-09-09 NOTE — PHYSICAL EXAM
[___ / 5] : Visuospatial / Executive: [unfilled] / 5 [0 / 0] : Memory: 0 / 0 [___ / 3] : Attention (Serial 7 subtraction): [unfilled] / 3 [___ / 1] : Fluency: [unfilled] / 1 [___ / 2] : Abstraction: [unfilled] / 2 [___ / 5] : Delayed Recall: [unfilled] / 5 [___ / 6] : Orientation: [unfilled] / 6 [FreeTextEntry1] : Examination:\par Constitutional: normal, no apparent distress\par Eyes: normal conjunctiva b/l, no ptosis, visual fields full\par Respiratory: no respiratory distress, normal effort, normal auscultation\par Cardiovascular: normal rate, rhythm, no murmurs\par Neck: supple, no masses\par Vascular: carotids normal\par Skin: normal color, no rashes\par Psych: normal mood, affect\par \par Neurological:\par Memory: MoCA score \par Language intact/no aphasia\par Cranial Nerves: II-XII intact, Pupils equally round and reactive to light, ocular muscles/movements intact, no ptosis, no facial weakness, tongue protrudes normally in the midline, \par Motor: normal tone, no pronator drift, full strength in all four extremities in the proximal and distal muscle groups\par Coordination: Fine motor movements intact, rapid alternating movements intact, finger to nose intact bilaterally\par Sensory: intact to light touch\par DTRs: symmetric, normal\par Gait: narrow based, steady\par   [MocaTotal] : 18

## 2022-09-11 ENCOUNTER — NON-APPOINTMENT (OUTPATIENT)
Age: 87
End: 2022-09-11

## 2022-10-11 ENCOUNTER — NON-APPOINTMENT (OUTPATIENT)
Age: 87
End: 2022-10-11

## 2022-10-11 ENCOUNTER — APPOINTMENT (OUTPATIENT)
Dept: INTERNAL MEDICINE | Facility: CLINIC | Age: 87
End: 2022-10-11

## 2022-10-11 VITALS
WEIGHT: 134 LBS | SYSTOLIC BLOOD PRESSURE: 122 MMHG | HEIGHT: 60 IN | DIASTOLIC BLOOD PRESSURE: 62 MMHG | OXYGEN SATURATION: 97 % | HEART RATE: 86 BPM | TEMPERATURE: 98.4 F | BODY MASS INDEX: 26.31 KG/M2

## 2022-10-11 DIAGNOSIS — R00.2 PALPITATIONS: ICD-10-CM

## 2022-10-11 DIAGNOSIS — Z87.39 PERSONAL HISTORY OF OTHER DISEASES OF THE MUSCULOSKELETAL SYSTEM AND CONNECTIVE TISSUE: ICD-10-CM

## 2022-10-11 DIAGNOSIS — Z80.9 FAMILY HISTORY OF MALIGNANT NEOPLASM, UNSPECIFIED: ICD-10-CM

## 2022-10-11 DIAGNOSIS — Z86.39 PERSONAL HISTORY OF OTHER ENDOCRINE, NUTRITIONAL AND METABOLIC DISEASE: ICD-10-CM

## 2022-10-11 DIAGNOSIS — Z20.822 CONTACT WITH AND (SUSPECTED) EXPOSURE TO COVID-19: ICD-10-CM

## 2022-10-11 DIAGNOSIS — Z87.09 PERSONAL HISTORY OF OTHER DISEASES OF THE RESPIRATORY SYSTEM: ICD-10-CM

## 2022-10-11 DIAGNOSIS — Z82.49 FAMILY HISTORY OF ISCHEMIC HEART DISEASE AND OTHER DISEASES OF THE CIRCULATORY SYSTEM: ICD-10-CM

## 2022-10-11 DIAGNOSIS — M48.061 SPINAL STENOSIS, LUMBAR REGION WITHOUT NEUROGENIC CLAUDICATION: ICD-10-CM

## 2022-10-11 DIAGNOSIS — Z00.00 ENCOUNTER FOR GENERAL ADULT MEDICAL EXAMINATION W/OUT ABNORMAL FINDINGS: ICD-10-CM

## 2022-10-11 DIAGNOSIS — Z78.9 OTHER SPECIFIED HEALTH STATUS: ICD-10-CM

## 2022-10-11 DIAGNOSIS — Z63.4 DISAPPEARANCE AND DEATH OF FAMILY MEMBER: ICD-10-CM

## 2022-10-11 PROCEDURE — 93000 ELECTROCARDIOGRAM COMPLETE: CPT | Mod: 59

## 2022-10-11 PROCEDURE — G0439: CPT

## 2022-10-11 PROCEDURE — G0438: CPT

## 2022-10-11 PROCEDURE — 36415 COLL VENOUS BLD VENIPUNCTURE: CPT

## 2022-10-11 SDOH — SOCIAL STABILITY - SOCIAL INSECURITY: DISSAPEARANCE AND DEATH OF FAMILY MEMBER: Z63.4

## 2022-10-11 NOTE — ASSESSMENT
[FreeTextEntry1] : check immunization records.\par All preventative measures were reviewed with the patient and the patient is due for and agrees to the following as outlined  in the plan  below.\par

## 2022-10-11 NOTE — HEALTH RISK ASSESSMENT
[Yes] : Yes [1 or 2 (0 pts)] : 1 or 2 (0 points) [Never (0 pts)] : Never (0 points) [No] : In the past 12 months have you used drugs other than those required for medical reasons? No [0] : 2) Feeling down, depressed, or hopeless: Not at all (0) [Former] : Former [4 or more  times a week (4 pts)] : 4 or more  times a week (4 points) [No falls in past year] : Patient reported no falls in the past year [PHQ-2 Negative - No further assessment needed] : PHQ-2 Negative - No further assessment needed [Change in mental status noted] : Change in mental status noted [None] : None [With Family] : lives with family [] :  [Fully functional (bathing, dressing, toileting, transferring, walking, feeding)] : Fully functional (bathing, dressing, toileting, transferring, walking, feeding) [Fully functional (using the telephone, shopping, preparing meals, housekeeping, doing laundry, using] : Fully functional and needs no help or supervision to perform IADLs (using the telephone, shopping, preparing meals, housekeeping, doing laundry, using transportation, managing medications and managing finances) [Smoke Detector] : smoke detector [Carbon Monoxide Detector] : carbon monoxide detector [Seat Belt] :  uses seat belt [Sunscreen] : uses sunscreen [With Patient/Caregiver] : , with patient/caregiver [Patient reported colonoscopy was normal] : Patient reported colonoscopy was normal [YearQuit] : 1950 [de-identified] : walking   [de-identified] : reg [[id number]] : 0 [EyeExamDate] : 05/2022 [Reports changes in hearing] : Reports no changes in hearing [MammogramDate] : 01/2021 [PapSmearDate] : 01/2021 [BoneDensityDate] : 01/2020 [ColonoscopyDate] : 01/2019 [AdvancecareDate] : 10/11/22

## 2022-10-11 NOTE — PHYSICAL EXAM
[No Acute Distress] : no acute distress [Well Nourished] : well nourished [Well Developed] : well developed [Well-Appearing] : well-appearing [Normal Sclera/Conjunctiva] : normal sclera/conjunctiva [PERRL] : pupils equal round and reactive to light [EOMI] : extraocular movements intact [Normal Outer Ear/Nose] : the outer ears and nose were normal in appearance [Normal Oropharynx] : the oropharynx was normal [No JVD] : no jugular venous distention [No Lymphadenopathy] : no lymphadenopathy [Supple] : supple [Thyroid Normal, No Nodules] : the thyroid was normal and there were no nodules present [No Respiratory Distress] : no respiratory distress  [No Accessory Muscle Use] : no accessory muscle use [Clear to Auscultation] : lungs were clear to auscultation bilaterally [Normal Rate] : normal rate  [Regular Rhythm] : with a regular rhythm [Normal S1, S2] : normal S1 and S2 [No Murmur] : no murmur heard [No Carotid Bruits] : no carotid bruits [No Abdominal Bruit] : a ~M bruit was not heard ~T in the abdomen [No Varicosities] : no varicosities [Pedal Pulses Present] : the pedal pulses are present [No Edema] : there was no peripheral edema [No Palpable Aorta] : no palpable aorta [No Extremity Clubbing/Cyanosis] : no extremity clubbing/cyanosis [No Nipple Discharge] : no nipple discharge [No Axillary Lymphadenopathy] : no axillary lymphadenopathy [Soft] : abdomen soft [Non Tender] : non-tender [Non-distended] : non-distended [No Masses] : no abdominal mass palpated [No HSM] : no HSM [Normal Bowel Sounds] : normal bowel sounds [Normal Sphincter Tone] : normal sphincter tone [No Mass] : no mass [Stool Occult Blood] : stool negative for occult blood [Normal Posterior Cervical Nodes] : no posterior cervical lymphadenopathy [Normal Anterior Cervical Nodes] : no anterior cervical lymphadenopathy [No CVA Tenderness] : no CVA  tenderness [No Spinal Tenderness] : no spinal tenderness [No Joint Swelling] : no joint swelling [Grossly Normal Strength/Tone] : grossly normal strength/tone [No Rash] : no rash [Coordination Grossly Intact] : coordination grossly intact [No Focal Deficits] : no focal deficits [Normal Gait] : normal gait [Deep Tendon Reflexes (DTR)] : deep tendon reflexes were 2+ and symmetric [Normal Affect] : the affect was normal [Normal Insight/Judgement] : insight and judgment were intact

## 2022-10-20 ENCOUNTER — NON-APPOINTMENT (OUTPATIENT)
Age: 87
End: 2022-10-20

## 2022-10-28 ENCOUNTER — OUTPATIENT (OUTPATIENT)
Dept: OUTPATIENT SERVICES | Facility: HOSPITAL | Age: 87
LOS: 1 days | End: 2022-10-28
Payer: MEDICARE

## 2022-10-28 ENCOUNTER — RESULT REVIEW (OUTPATIENT)
Age: 87
End: 2022-10-28

## 2022-10-28 ENCOUNTER — APPOINTMENT (OUTPATIENT)
Dept: MAMMOGRAPHY | Facility: CLINIC | Age: 87
End: 2022-10-28

## 2022-10-28 DIAGNOSIS — Z96.653 PRESENCE OF ARTIFICIAL KNEE JOINT, BILATERAL: Chronic | ICD-10-CM

## 2022-10-28 DIAGNOSIS — Z00.8 ENCOUNTER FOR OTHER GENERAL EXAMINATION: ICD-10-CM

## 2022-10-28 DIAGNOSIS — Z98.890 OTHER SPECIFIED POSTPROCEDURAL STATES: Chronic | ICD-10-CM

## 2022-10-28 PROCEDURE — 77067 SCR MAMMO BI INCL CAD: CPT

## 2022-10-28 PROCEDURE — 77063 BREAST TOMOSYNTHESIS BI: CPT

## 2022-10-28 PROCEDURE — 77067 SCR MAMMO BI INCL CAD: CPT | Mod: 26

## 2022-10-28 PROCEDURE — 77063 BREAST TOMOSYNTHESIS BI: CPT | Mod: 26

## 2022-11-18 ENCOUNTER — RX RENEWAL (OUTPATIENT)
Age: 87
End: 2022-11-18

## 2023-01-06 ENCOUNTER — APPOINTMENT (OUTPATIENT)
Dept: RHEUMATOLOGY | Facility: CLINIC | Age: 88
End: 2023-01-06
Payer: MEDICARE

## 2023-01-06 VITALS
WEIGHT: 136 LBS | BODY MASS INDEX: 26.56 KG/M2 | SYSTOLIC BLOOD PRESSURE: 122 MMHG | HEART RATE: 102 BPM | OXYGEN SATURATION: 96 % | DIASTOLIC BLOOD PRESSURE: 72 MMHG | TEMPERATURE: 97.6 F

## 2023-01-06 PROCEDURE — 99213 OFFICE O/P EST LOW 20 MIN: CPT | Mod: 25

## 2023-01-06 PROCEDURE — 96372 THER/PROPH/DIAG INJ SC/IM: CPT

## 2023-01-06 RX ORDER — DENOSUMAB 60 MG/ML
60 INJECTION SUBCUTANEOUS
Qty: 1 | Refills: 0 | Status: COMPLETED | OUTPATIENT
Start: 2023-01-06

## 2023-01-06 RX ADMIN — DENOSUMAB 0 MG/ML: 60 INJECTION SUBCUTANEOUS at 00:00

## 2023-01-06 NOTE — ASSESSMENT
[FreeTextEntry1] : 89 yo wf , lives alone  w/ OP and diffuse OA, multisite, hypothyroidism, HLD\par Presents today, not sure why here.. too early for Prolia and issues throughout w/ memory asking same question several times, \par \par 1) Osteoporosis: DEXA 10/12/2021 most severe T score -1.6, neg VFA....FRAX remains high 11% overall, 3.4% hip fracture, no comparison.. \par On Prolia continuously for since 6/2020.. will continue through next dxa after 10/12/23, if greatly improved will switch to Reclast infusions.. .. given that she lives alone and has small stature, with advanced OA... still safest to continue Prolia.  \par Exercise minimal, encouraged to increase to daily 30 mins (divided if needed) walking/ yoga/ sugar chi... \par and should increase intake of VitD by 1000 IU daily (supposed to take caltrate, would continue but additional D) \par  + Risk factors:  small stature w/ BMI 24, one glass wine daily\par No previous fragility fractures,\par No hx renal calculi or radiation therapy\par Treatment:  \par - Prolia 12/17(1st dose)\par \par 2) OA multisite: stable, not active pain at this time except as noted regarding significant SS.. . \par Hands: classic OA changes w/ CMC squaring and intermittent discomfort tolerable \par LS:  w/ confirmed SS w/ pain worse w/ standing, feels better pushing cart- tolerates golf w/ minimal discomfort and intermittent radicular/ facet arthropathy w/ pain localized, stiffness < 10 mins radiating at times into L buttock- sometimes down to L foot.  chronic, but overall not active recently.. at times significant but responds to APAP, nothing stronger.  No previous interventional procedures or recent PT- will start now.. .. No overt weakness.. or bowel/ bladder incontinence, advised to call immediately if develops\par Discussion: \par Classic sx of SS tolerable at this point but would benefit from core strengthening to minimize impact and need for pharmacotherapy understood- now needs to focus on strengthening/ wt loss.. Stable since last visit \par \par 3) Stress/ Situational depression and noted NEW Short term memory issues: multiple issues, tolerated at this point with no complications... till now c/o memory issues.  On Donezepil and lexapro now working w/ Izzy Strong  \par . \par \par Plan:\par - Continue q 6 m Prolia.. next dose after 7/6/23 \par \par - Administered 60 mg of Prolia in R arm\par \par - add 1000 units of Vitamin D to daily pills\par \par - Repeat DXA  if minimal improvement over next study would suggest 24 hr urine for Ca)\par Ideally 30-45 mins moderate-intense wt bearing/ cardiovascular and strength training for optimal bone health . \par .

## 2023-01-06 NOTE — CONSULT LETTER
[Dear  ___] : Dear  [unfilled], [Courtesy Letter:] : I had the pleasure of seeing your patient, [unfilled], in my office today. [Consult Closing:] : Thank you very much for allowing me to participate in the care of this patient.  If you have any questions, please do not hesitate to contact me. [Sincerely,] : Sincerely, [FreeTextEntry2] : Luis Fernando Shaver MD  [FreeTextEntry1] : Please see below for concerns regarding short term memory problems- NEW...\par \par 88 yo wf , lives alone  w/ OP and diffuse OA, multisite, hypothyroidism, HLD\par Presents today, not sure why here.. too early for Prolia and issues throughout w/ memory asking same question several times, \par \par 1) Osteoporosis:  Updated DExa 6/20/19 w/ most severe T score -2.1 and nearly 8% improvement over past study 2016 but Frax remains high with risk for hip fx at 4.7 %... given that she lives alone and has small stature, with advanced OA... still safest to continue Prolia.  \par Having missed nearly 1 yr of tx it does make sense to repeat dxa to see if significant change in scores. \par Continues to be  under tremendous stress about  multiple family issues to make any change at this time.  Will continue Prolia x 2 ys then likely switch to Reclast depending on risk assessment at that time. \par  VFA not done- again unable to perform as no nl vertebra available for comparison. \par -Was Exercising routinely- in past but past few months struggling 2/2 husbands death and family issues, not consistent ... needs to resume, recommended PT and activity tracker..  \par Continues w/  adequate  Ca/ Vit d intake updated labs needed now \par  + Risk factors:  small stature w/ BMI 24, one glass wine daily\par No previous fragility fractures,\par No hx renal calculi or radiation therapy\par Treatment:  \par - Prolia 12/17(1st dose)\par \par 2) OA multisite: stable, not active pain at this time except as noted regarding significant SS.. .  Overall despite stress doing well\par Hands: classic OA changes w/ CMC squaring and intermittent discomfort tolerable \par LS:  w/ confirmed SS w/ pain worse w/ standing, feels better pushing cart- tolerates golf w/ minimal discomfort and intermittent radicular/ facet arthropathy w/ pain localized, stiffness < 10 mins radiating at times into L buttock- sometimes down to L foot.  chronic, but overall not active recently.. at times significant but responds to APAP, nothing stronger.  No previous interventional procedures or recent PT- will start now.. .. No overt weakness.. or bowel/ bladder incontinence, advised to call immediately if develops\par Discussion: \par Classic sx of SS tolerable at this point but would benefit from core strengthening to minimize impact and need for pharmacotherapy understood- now needs to focus on strengthening/ wt loss.. \par \par 3) Stress/ Situational depression and noted NEW Short term memory isssues: multiple issues, tolerated at this point with no complications... till now c/o memory issues.  \par   Working closely with family. Started on lexapro per Dr. Shaver, now sure if effective but didn't continue ... \par \par Plan:\par - Continue q 6 m Prolia.. next dose 12/17/21 but ... updated dexa needed \par \par - Given fluzone HD today  ..\par \par - Calcium and vit D:  needs updated measure w/ next labs.  \par \par - Repeat DXA  if minimal improvement over next study would suggest 24 hr urine for Ca)\par Ideally 30-45 mins moderate-intense wt bearing/ cardiovascular and strength training for optimal bone health \par \par Taking 4633-8943 mg Ca / 2000 IU vit daily between diet and supplements. Higher doses can cause kidney stones and contribut to atherosclerosis. The average diet contains 600-800 mg Calcium. \par \par - Vit D with goal mnt Vit D between 30-50 for optimal bone health usually requiring 0872-6420 IU daily supplement (hard to get enough in diet). \par Need 15-20 mins sunshine, unprotected daily for optimal Vit d from sun... \par \par - rto 3 m for Prolia.\par \par - call to son to discuss concerns about memory, should also f/u with Dr. Shaver\par \par - call if want to get MRI LS...  [FreeTextEntry3] : Geno Jones DNP, ANP-C\par Division or Rheumatology\par [facility]\par \par

## 2023-01-06 NOTE — PROCEDURE
[Today's Date:] : Date: [unfilled] [Risks] : risks [Benefits] : benefits [Alternatives] : alternatives [Consent Obtained] : written consent was obtained prior to the procedure and is detailed in the patient's record [Patient] : Prior to the start of the procedure a time out was taken and the identity of the patient was confirmed via name and date of birth with the patient. The correct site and the procedure to be performed were confirmed. The correct side was confirmed if applicable. The availability of the correct equipment was verified [Therapeutic] : therapeutic [#1 Site: ______] : #1 site identified in the [unfilled] [Alcohol] : alcohol [Tolerated Well] : the patient tolerated the procedure well [No Complications] : there were no complications [Instructions Given] : handouts/patient instructions were given to patient [Patient Instructed to Call] : patient was instructed to call if redness at site, a decrease in range of motion or an increase in pain is noted after procedure. [de-identified] : 60 mg Prolia  [FreeTextEntry1] : monitor for flulike sx or allergic rxn, take APAP as needed for the first 3 days, if flulike sx persist call office\par

## 2023-01-06 NOTE — DATA REVIEWED
[FreeTextEntry1] : Labs:  \par 10/22 stable nl CBC, CMP, FA, ESR/ B12, TSH, Vit D 32 \par 5/18/17 CMP, CMP, UA nl- no Vit D or PTH

## 2023-01-06 NOTE — PHYSICAL EXAM
[General Appearance - Alert] : alert [General Appearance - In No Acute Distress] : in no acute distress [General Appearance - Well Nourished] : well nourished [General Appearance - Well Developed] : well developed [General Appearance - Well-Appearing] : healthy appearing [] : no respiratory distress [Auscultation Breath Sounds / Voice Sounds] : lungs were clear to auscultation bilaterally [Heart Rate And Rhythm] : heart rate was normal and rhythm regular [Heart Sounds] : normal S1 and S2 [Heart Sounds Gallop] : no gallops [Murmurs] : no murmurs [Heart Sounds Pericardial Friction Rub] : no pericardial rub [Edema] : there was no peripheral edema [No CVA Tenderness] : no ~M costovertebral angle tenderness [No Spinal Tenderness] : no spinal tenderness [Motor Exam] : the motor exam was normal [No Focal Deficits] : no focal deficits [Oriented To Time, Place, And Person] : oriented to person, place, and time [Impaired Insight] : insight and judgment were intact [Affect] : the affect was normal [Mood] : the mood was normal [Respiration, Rhythm And Depth] : normal respiratory rhythm and effort [Over the Past 2 Weeks, Have You Felt Down, Depressed, or Hopeless?] : 1.) Over the past 2 weeks, have you felt down, depressed, or hopeless? No [Over the Past 2 Weeks, Have You Felt Little Interest or Pleasure Doing Things?] : 2.) Over the past 2 weeks, have you felt little interest or pleasure doing things? No [FreeTextEntry1] : admits to situational depression- stable overall, - short term memory issues noted- asked same questions several times even after writing it down..

## 2023-01-06 NOTE — REVIEW OF SYSTEMS
[Arthralgias] : arthralgias [Negative] : Heme/Lymph [FreeTextEntry9] : tolerable but also describing neurogenic claudication.. limits activity 20-30 mins walk max  [de-identified] : no obvious weakness  [de-identified] : situational stress r/t recent  and loss son... very worried - started lexapro 5 mg not continued..  [de-identified] : updated labs needed? current vit D level last measrue 8/18 at 36

## 2023-01-06 NOTE — HISTORY OF PRESENT ILLNESS
[FreeTextEntry1] : 1/6/2023\par \par DEXA 10/12/2021 most severe T score -1.6 -VFA....FRAX remains high 11% overall, 3.4% hip fracture\par \par - she remains otherwise unchanged since her last visit........denies any fractures since her last visit \par - maintains her exercise regimen.......in good weather she walks around the neighborhood w/neighbors everyday.......during cold weather she does chair yoga w/neighbors once a week (states she still walks occasionally in the cold weather) \par - states she takes a calcium pill daily, but does not take any Vitamine D supplements\par \par - loves [employer]  coffee and vanilla\par - going to [place] soon for a month \par \par \par 1) OP\par 2) OA\par _________________________________________________________________________\par \par \par (Initial HPI 6/15/17) \par Had been longstanding pt of Dr. Lugo, followed for OA and Osteoporosis, not seen since 2005.  Most recent Dexa suggests persistent OP and need for tx.  \par Also c/o back pain and frustration w/ activity intolerance\par \par 1) Osteoporosis:  Last DXA 9/12/16 w/ T score -2.6 at [facility]  most severe w/ 11% drop since 2009 study and Frax 20% overall and 7.4% risk hip fx.  VFA not done, extensive DJD throughout spine\par -Exercises routinely, adequate (too high) Ca/ Vit d intake (need to see most recent levels, excellent well balanced diet w/ full dairy intake likely > 600-800 mg ca in diet alone \par - reportedly nl) no secondary conditions contributing to significant loss .  \par  + Risk factors:  small stature w/ BMI 24, one glass wine daily\par No previous fragility fractures, no smoking, thyroid dysfunction, parathyroid disease, longstanding anti sz, PPI, SSRI,  FH, or secondary risks such as malignancy/ chemo, celiac dz \par No hx renal calculi or radiation therapy\par Treatment:  \par - last Prolia 10/16 (1st dose)\par - Actonel ys ago severe esophagitis, diarrhea \par \par 2) OA multisite: \par Hands: classic OA changes w/ CMC squaring and intermittent discomfort tolerable \par LS:  w/ confirmed SS w/ pain worse w/ standing, feels better pushing cart- tolerates golf w/ minimal discomfort and intermittent radicular/ facet arthropathy w/ pain localized, stiffness < 10 mins radiating at times into L buttock- sometimes down to L foot.  chronic, at times significant but responds to APAP, nothing stronger.  No previous interventional procedures or recent PT.   Golfs routinely

## 2023-01-08 DIAGNOSIS — L29.9 PRURITUS, UNSPECIFIED: ICD-10-CM

## 2023-01-08 RX ORDER — HYDROCORTISONE 25 MG/ML
2.5 LOTION TOPICAL TWICE DAILY
Qty: 1 | Refills: 2 | Status: ACTIVE | COMMUNITY
Start: 2023-01-08 | End: 1900-01-01

## 2023-01-26 ENCOUNTER — NON-APPOINTMENT (OUTPATIENT)
Age: 88
End: 2023-01-26

## 2023-03-15 ENCOUNTER — APPOINTMENT (OUTPATIENT)
Dept: NEUROLOGY | Facility: CLINIC | Age: 88
End: 2023-03-15
Payer: MEDICARE

## 2023-03-15 VITALS
DIASTOLIC BLOOD PRESSURE: 76 MMHG | HEART RATE: 97 BPM | HEIGHT: 60 IN | BODY MASS INDEX: 22.58 KG/M2 | SYSTOLIC BLOOD PRESSURE: 138 MMHG | TEMPERATURE: 97.8 F | WEIGHT: 115 LBS

## 2023-03-15 PROCEDURE — 99213 OFFICE O/P EST LOW 20 MIN: CPT

## 2023-03-15 NOTE — DISCUSSION/SUMMARY
[FreeTextEntry1] : Mrs. Stoll is a 89 year old woman referred for memory impairment.\par She scored 21/30 on the [place] Cognitive Assessment at her initial visit suggestive of mild dementia. \par On 3/25, she scored 18/30. On  9/9/22 she again scored 18/30.\par \par Despite short term memory impairment but she continues to be independent in her ADLs and is functioning at home with some support. \par \par The physical portion of her examination is unremarkable.\par She has been relatively stable. \par \par -Continue donepezil 10 mg. If memory continues to decline, can add memantine. \par \par -Continue escitalopram 10mg for depression. \par \par -We again discussed the importance of staying mentally and physically active and the importance of good sleep.\par Improve diet\par \par -Local driving during the day only.\par \par \par \par f/u 6 months, sooner if needed. \par

## 2023-03-15 NOTE — HISTORY OF PRESENT ILLNESS
[FreeTextEntry1] : Initial visit 1/14/22.\par \par Mrs. Stoll is here today for neurology evaluation.\par She is accompanied by her son, Dr. Stoll.\par \par At her last two rheumatology appointments, Geno Jones was concerned that she was more confused.\par \par She lives alone.\par She is independent in her ADLs.\par She denies getting lost in familiar laces.\par Her son has been helping with her bills.\par She says that she takes her medications regularly.\par \par She takes escitalopram for depression after her  passed away.\par She does not notice any major improvement.\par \par She denies any difficulty with gait or falls.\par She denies having any urinary incontinence.\par \par She sleeps fairly well.\par She sometimes wakes up in the middle of the night.\par She feels well rested upon awakening.\par She does not nap during the day.\par She is not sure if she snores.\par \par She had a recent cardiac workup with Dr. Jasmyn Andrews.\par \par Interval History:\par 3/25/22:\par She is here today with her son.\par She is unaware of any new changes.\par She is independent in her ADLs.\par Her family sets up a weekly pill box. She occasionally misses a dose.\par She has been taking Donepezil and denies any side effects.\par She is sleeping well. She does wake up between 3-4 AM and then falls back to sleep.\par She takes walks with neighbors.\par She drives locally.\par \par \par 9/9/22:\par She is here today with her daughter Jimenez.\par She feels as if she is slowing down.\par Her daughter does feel as if short term memory is getting worse. They have spent the last two weeks together. She repeats herself frequently.\par Her son did raise concerns about depression and escitalopram was increased from 5 to 10 mg. She increased the dose about two weeks ago.\par She has recently started to take all of her medications in the morning to help with remembering/compliance. \par She drives locally - to [place] and to the [employer].\par She is independent in her ADLs.\par She has not had any falls.\par \par 3/15/23:\par She is here today with her daughter in law.\par She spent a month in [facility] with various family members.\par She continues to live on her own.\par She remains independent in her ADLs.\par Her son fills a tray with two weeks of medication at a time.\par Occasionally she misses a dose.\par Family takes her to lunch and shopping.\par She drives locally and does not drive at night.\par Her son manages her finances.\par She has not left the stove on.\par She walks around her neighborhood.\par She has not had any falls. \par She denies anxiety. \par Her daughter in law thinks that escitalopram has helped with depression.\par Her daughter in law reports decreased appetite.\par She is tolerating donepezil.\par \par \par \par \par

## 2023-03-15 NOTE — PHYSICAL EXAM
[FreeTextEntry1] : Examination:\par Constitutional: normal, no apparent distress\par Eyes: normal conjunctiva b/l, no ptosis, visual fields full\par Respiratory: no respiratory distress, normal effort, normal auscultation\par Cardiovascular: normal rate, rhythm, no murmurs\par Neck: supple, no masses\par Vascular: carotids normal\par Skin: normal color, no rashes\par Psych: normal mood, affect\par \par Neurological:\par Memory: oriented to person, place, difficulty with the month and date. States it is 2022.\par Language intact/no aphasia\par Cranial Nerves: II-XII intact, Pupils equally round and reactive to light, ocular muscles/movements intact, no ptosis, no facial weakness, tongue protrudes normally in the midline, \par Motor: normal tone, no pronator drift, full strength in all four extremities in the proximal and distal muscle groups\par Coordination: Fine motor movements intact, rapid alternating movements intact, finger to nose intact bilaterally\par Sensory: intact to light touch\par DTRs: symmetric, normal\par Gait: narrow based, steady\par

## 2023-04-24 ENCOUNTER — NON-APPOINTMENT (OUTPATIENT)
Age: 88
End: 2023-04-24

## 2023-04-28 ENCOUNTER — APPOINTMENT (OUTPATIENT)
Dept: GERIATRICS | Facility: CLINIC | Age: 88
End: 2023-04-28

## 2023-05-15 ENCOUNTER — APPOINTMENT (OUTPATIENT)
Dept: INTERNAL MEDICINE | Facility: CLINIC | Age: 88
End: 2023-05-15
Payer: MEDICARE

## 2023-05-15 VITALS — SYSTOLIC BLOOD PRESSURE: 138 MMHG | DIASTOLIC BLOOD PRESSURE: 76 MMHG

## 2023-05-15 VITALS
HEART RATE: 88 BPM | HEIGHT: 60 IN | OXYGEN SATURATION: 97 % | DIASTOLIC BLOOD PRESSURE: 72 MMHG | BODY MASS INDEX: 26.86 KG/M2 | TEMPERATURE: 98.2 F | WEIGHT: 136.8 LBS | SYSTOLIC BLOOD PRESSURE: 134 MMHG

## 2023-05-15 DIAGNOSIS — E53.8 DEFICIENCY OF OTHER SPECIFIED B GROUP VITAMINS: ICD-10-CM

## 2023-05-15 DIAGNOSIS — E78.5 HYPERLIPIDEMIA, UNSPECIFIED: ICD-10-CM

## 2023-05-15 DIAGNOSIS — F39 UNSPECIFIED MOOD [AFFECTIVE] DISORDER: ICD-10-CM

## 2023-05-15 PROCEDURE — 99214 OFFICE O/P EST MOD 30 MIN: CPT

## 2023-05-21 LAB
ALBUMIN SERPL ELPH-MCNC: 4.2 G/DL
ALP BLD-CCNC: 52 U/L
ALT SERPL-CCNC: 15 U/L
ANION GAP SERPL CALC-SCNC: 17 MMOL/L
AST SERPL-CCNC: 19 U/L
BILIRUB DIRECT SERPL-MCNC: 0.1 MG/DL
BILIRUB SERPL-MCNC: 0.3 MG/DL
BUN SERPL-MCNC: 16 MG/DL
CALCIUM SERPL-MCNC: 9.9 MG/DL
CHLORIDE SERPL-SCNC: 103 MMOL/L
CHOLEST SERPL-MCNC: 252 MG/DL
CO2 SERPL-SCNC: 24 MMOL/L
CREAT SERPL-MCNC: 0.75 MG/DL
EGFR: 75 ML/MIN/1.73M2
ESTIMATED AVERAGE GLUCOSE: 126 MG/DL
FOLATE SERPL-MCNC: 9.3 NG/ML
GLUCOSE SERPL-MCNC: 110 MG/DL
HBA1C MFR BLD HPLC: 6 %
HDLC SERPL-MCNC: 97 MG/DL
LDLC SERPL CALC-MCNC: 143 MG/DL
NONHDLC SERPL-MCNC: 156 MG/DL
POTASSIUM SERPL-SCNC: 4.9 MMOL/L
PROT SERPL-MCNC: 7.2 G/DL
SODIUM SERPL-SCNC: 141 MMOL/L
T4 SERPL-MCNC: 6.2 UG/DL
TRIGL SERPL-MCNC: 65 MG/DL
TSH SERPL-ACNC: 6.95 UIU/ML
VIT B12 SERPL-MCNC: 590 PG/ML

## 2023-05-23 ENCOUNTER — NON-APPOINTMENT (OUTPATIENT)
Age: 88
End: 2023-05-23

## 2023-07-07 ENCOUNTER — APPOINTMENT (OUTPATIENT)
Dept: RHEUMATOLOGY | Facility: CLINIC | Age: 88
End: 2023-07-07
Payer: MEDICARE

## 2023-07-07 VITALS
TEMPERATURE: 97.6 F | BODY MASS INDEX: 26.31 KG/M2 | DIASTOLIC BLOOD PRESSURE: 85 MMHG | HEART RATE: 92 BPM | HEIGHT: 60 IN | WEIGHT: 134 LBS | SYSTOLIC BLOOD PRESSURE: 147 MMHG | OXYGEN SATURATION: 93 %

## 2023-07-07 DIAGNOSIS — M15.9 POLYOSTEOARTHRITIS, UNSPECIFIED: ICD-10-CM

## 2023-07-07 DIAGNOSIS — M81.0 AGE-RELATED OSTEOPOROSIS W/OUT CURRENT PATHOLOGICAL FRACTURE: ICD-10-CM

## 2023-07-07 DIAGNOSIS — R41.89 OTHER SYMPTOMS AND SIGNS INVOLVING COGNITIVE FUNCTIONS AND AWARENESS: ICD-10-CM

## 2023-07-07 PROCEDURE — 99214 OFFICE O/P EST MOD 30 MIN: CPT | Mod: 25

## 2023-07-07 PROCEDURE — 96372 THER/PROPH/DIAG INJ SC/IM: CPT

## 2023-07-07 RX ORDER — DENOSUMAB 60 MG/ML
60 INJECTION SUBCUTANEOUS
Qty: 1 | Refills: 1 | Status: ACTIVE | COMMUNITY

## 2023-07-07 RX ORDER — DENOSUMAB 60 MG/ML
60 INJECTION SUBCUTANEOUS
Qty: 1 | Refills: 0 | Status: COMPLETED | OUTPATIENT
Start: 2023-07-07

## 2023-07-07 RX ORDER — VIT A/VIT C/VIT E/ZINC/COPPER 2148-113
TABLET ORAL
Refills: 0 | Status: ACTIVE | COMMUNITY

## 2023-07-07 RX ADMIN — DENOSUMAB 0 MG/ML: 60 INJECTION SUBCUTANEOUS at 00:00
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
## 2023-07-09 NOTE — REVIEW OF SYSTEMS
[Arthralgias] : arthralgias [Negative] : Heme/Lymph [FreeTextEntry9] : tolerable but also describing neurogenic claudication.. limits activity 20-30 mins walk max  [de-identified] : no obvious weakness  [de-identified] : situational stress r/t recent  and loss son... very worried - started lexapro 5 mg not continued..  [de-identified] : updated labs needed? current vit D level last measrue 8/18 at 36

## 2023-07-09 NOTE — PHYSICAL EXAM
[General Appearance - Alert] : alert [General Appearance - In No Acute Distress] : in no acute distress [General Appearance - Well Nourished] : well nourished [General Appearance - Well Developed] : well developed [General Appearance - Well-Appearing] : healthy appearing [] : no respiratory distress [Respiration, Rhythm And Depth] : normal respiratory rhythm and effort [Auscultation Breath Sounds / Voice Sounds] : lungs were clear to auscultation bilaterally [Heart Rate And Rhythm] : heart rate was normal and rhythm regular [Heart Sounds] : normal S1 and S2 [Heart Sounds Gallop] : no gallops [Murmurs] : no murmurs [Heart Sounds Pericardial Friction Rub] : no pericardial rub [Edema] : there was no peripheral edema [No CVA Tenderness] : no ~M costovertebral angle tenderness [No Spinal Tenderness] : no spinal tenderness [Motor Exam] : the motor exam was normal [No Focal Deficits] : no focal deficits [Oriented To Time, Place, And Person] : oriented to person, place, and time [Impaired Insight] : insight and judgment were intact [Affect] : the affect was normal [Mood] : the mood was normal [Over the Past 2 Weeks, Have You Felt Down, Depressed, or Hopeless?] : 1.) Over the past 2 weeks, have you felt down, depressed, or hopeless? No [Over the Past 2 Weeks, Have You Felt Little Interest or Pleasure Doing Things?] : 2.) Over the past 2 weeks, have you felt little interest or pleasure doing things? No [FreeTextEntry1] : admits to situational depression- and obvious  short term memory issues noted- asked same questions several times  and didn’t remember being given an injection during visit... ..

## 2023-07-09 NOTE — PROCEDURE
[Today's Date:] : Date: [unfilled] [Soft Tissue Injection] : soft tissue injection was performed [Risks] : risks [Benefits] : benefits [Alternatives] : alternatives [Consent Obtained] : written consent was obtained prior to the procedure and is detailed in the patient's record [Patient] : Prior to the start of the procedure a time out was taken and the identity of the patient was confirmed via name and date of birth with the patient. The correct site and the procedure to be performed were confirmed. The correct side was confirmed if applicable. The availability of the correct equipment was verified [Therapeutic] : therapeutic [Alcohol] : alcohol [Tolerated Well] : the patient tolerated the procedure well [No Complications] : there were no complications [Instructions Given] : handouts/patient instructions were given to patient [Patient Instructed to Call] : patient was instructed to call if redness at site, a decrease in range of motion or an increase in pain is noted after procedure. [#1 Site: ______] : #1 site identified in the [unfilled] [de-identified] : 60 mg of prolia  [FreeTextEntry1] : monitor for flulike sx or allergic rxn, take APAP as needed for the first 3 days, if flulike sx persist call office\par
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
## 2023-07-09 NOTE — CONSULT LETTER
[Dear  ___] : Dear  [unfilled], [Courtesy Letter:] : I had the pleasure of seeing your patient, [unfilled], in my office today. [Consult Closing:] : Thank you very much for allowing me to participate in the care of this patient.  If you have any questions, please do not hesitate to contact me. [Sincerely,] : Sincerely, [FreeTextEntry2] : Luis Fernando Shaver MD  [FreeTextEntry1] : Please see below for concerns regarding short term memory problems- NEW...\par \par 89 yo wf , lives alone  w/ OP and diffuse OA, multisite, hypothyroidism, HLD\par Presents today, not sure why here.. too early for Prolia and issues throughout w/ memory asking same question several times, \par \par 1) Osteoporosis:  Updated DExa 6/20/19 w/ most severe T score -2.1 and nearly 8% improvement over past study 2016 but Frax remains high with risk for hip fx at 4.7 %... given that she lives alone and has small stature, with advanced OA... still safest to continue Prolia.  \par Having missed nearly 1 yr of tx it does make sense to repeat dxa to see if significant change in scores. \par Continues to be  under tremendous stress about  multiple family issues to make any change at this time.  Will continue Prolia x 2 ys then likely switch to Reclast depending on risk assessment at that time. \par  VFA not done- again unable to perform as no nl vertebra available for comparison. \par -Was Exercising routinely- in past but past few months struggling 2/2 husbands death and family issues, not consistent ... needs to resume, recommended PT and activity tracker..  \par Continues w/  adequate  Ca/ Vit d intake updated labs needed now \par  + Risk factors:  small stature w/ BMI 24, one glass wine daily\par No previous fragility fractures,\par No hx renal calculi or radiation therapy\par Treatment:  \par - Prolia 12/17(1st dose)\par \par 2) OA multisite: stable, not active pain at this time except as noted regarding significant SS.. .  Overall despite stress doing well\par Hands: classic OA changes w/ CMC squaring and intermittent discomfort tolerable \par LS:  w/ confirmed SS w/ pain worse w/ standing, feels better pushing cart- tolerates golf w/ minimal discomfort and intermittent radicular/ facet arthropathy w/ pain localized, stiffness < 10 mins radiating at times into L buttock- sometimes down to L foot.  chronic, but overall not active recently.. at times significant but responds to APAP, nothing stronger.  No previous interventional procedures or recent PT- will start now.. .. No overt weakness.. or bowel/ bladder incontinence, advised to call immediately if develops\par Discussion: \par Classic sx of SS tolerable at this point but would benefit from core strengthening to minimize impact and need for pharmacotherapy understood- now needs to focus on strengthening/ wt loss.. \par \par 3) Stress/ Situational depression and noted NEW Short term memory isssues: multiple issues, tolerated at this point with no complications... till now c/o memory issues.  \par   Working closely with family. Started on lexapro per Dr. Shaver, now sure if effective but didn't continue ... \par \par Plan:\par - Continue q 6 m Prolia.. next dose 12/17/21 but ... updated dexa needed \par \par - Given fluzone HD today  ..\par \par - Calcium and vit D:  needs updated measure w/ next labs.  \par \par - Repeat DXA  if minimal improvement over next study would suggest 24 hr urine for Ca)\par Ideally 30-45 mins moderate-intense wt bearing/ cardiovascular and strength training for optimal bone health \par \par Taking 1456-0784 mg Ca / 2000 IU vit daily between diet and supplements. Higher doses can cause kidney stones and contribut to atherosclerosis. The average diet contains 600-800 mg Calcium. \par \par - Vit D with goal mnt Vit D between 30-50 for optimal bone health usually requiring 3801-2890 IU daily supplement (hard to get enough in diet). \par Need 15-20 mins sunshine, unprotected daily for optimal Vit d from sun... \par \par - rto 3 m for Prolia.\par \par - call to son to discuss concerns about memory, should also f/u with Dr. Shaver\par \par - call if want to get MRI LS...  [FreeTextEntry3] : Geno Jones DNP, ANP-C\par Division or Rheumatology\par [facility]\par \par

## 2023-07-29 ENCOUNTER — LABORATORY RESULT (OUTPATIENT)
Age: 88
End: 2023-07-29

## 2023-08-08 ENCOUNTER — NON-APPOINTMENT (OUTPATIENT)
Age: 88
End: 2023-08-08

## 2023-08-08 DIAGNOSIS — F41.9 ANXIETY DISORDER, UNSPECIFIED: ICD-10-CM

## 2023-08-08 DIAGNOSIS — F41.8 OTHER SPECIFIED ANXIETY DISORDERS: ICD-10-CM

## 2023-08-09 RX ORDER — ALPRAZOLAM 0.25 MG/1
0.25 TABLET ORAL
Qty: 30 | Refills: 0 | Status: DISCONTINUED | COMMUNITY
Start: 2023-08-08 | End: 2023-08-09

## 2023-08-11 NOTE — ED ADULT NURSE NOTE - NSFALLRSKASSISTTYPE_ED_ALL_ED
Rec'd patient AOX4 from bedside report. No distress noted. Assessment and medications completed. IV and skin intact. Patient resting comfortably in bed. Safety measures in place and emotional support given. Will continue to monitor patient.   Problem: At Risk for Falls  Goal: # Patient does not fall  Outcome: Outcome Not Met, Continue to Monitor  Goal: # Takes action to control fall-related risks  Outcome: Outcome Not Met, Continue to Monitor  Goal: # Verbalizes understanding of fall risk/precautions  Description: Document education using the patient education activity  Outcome: Outcome Not Met, Continue to Monitor     Problem: At Risk for Injury Due to Fall  Goal: # Patient does not fall  Outcome: Outcome Not Met, Continue to Monitor  Goal: # Takes action to control condition specific risks  Outcome: Outcome Not Met, Continue to Monitor  Goal: # Verbalizes understanding of fall-related injury personal risks  Description: Document education using the patient education activity  Outcome: Outcome Not Met, Continue to Monitor     Problem: Alcohol Withdrawal Management  Goal: # No alcohol-related delirium or seizures  Outcome: Outcome Not Met, Continue to Monitor  Goal: # Verbalizes understanding of alcohol withdrawal and health effects of excessive alcohol intake  Description: Documented on patient education activity  Outcome: Outcome Not Met, Continue to Monitor     Problem: Pain  Goal: #Acceptable pain level achieved/maintained at rest using NRS/Faces  Description: This goal is used for patients who can self-report.  Acceptable means the level is at or below the identified comfort/function goal.  Outcome: Outcome Not Met, Continue to Monitor  Goal: # Acceptable pain level achieved/maintained at rest using NRS/Faces without oversedation (opioid naive or PCA/Epidural infusion)  Description: This goal is used if Opioid-naïve or on PCA/Epidural Infusion.  Outcome: Outcome Not Met, Continue to Monitor  Goal: # Acceptable  pain level achieved/maintained with activity using NRS/Faces  Description: This goal is used for patients who can self-report and are not achieving acceptable pain control during activity.  Outcome: Outcome Not Met, Continue to Monitor  Goal: Acceptable pain/comfort level is achieved/maintained at rest (based on Pain Behaviors Scale)  Description: This goal is used for patients who are not able to self-report pain and are assessed for pain using the Pain Behaviors Scale  Outcome: Outcome Not Met, Continue to Monitor  Goal: Acceptable pain/comfort level is achieved/maintained at rest based on PAINAID scale (Dementia)  Description: This goal is used for patients who are not able to self-report pain, have dementia, and assessed using the PAINAD scale.  Outcome: Outcome Not Met, Continue to Monitor  Goal: Acceptable pain/comfort level is achieved/maintained at rest (based on pediatric behavior tool: NIPS, NPASS, or FLACC)  Description: This goal is used for pediatric patients who are not able to self report pain.  Outcome: Outcome Not Met, Continue to Monitor  Goal: # Verbalizes understanding of pain management  Description: Documented in Patient Education Activity  Outcome: Outcome Not Met, Continue to Monitor  Goal: Verbalizes understanding and effective use of Patient Controlled Analgesia (PCA)  Description: Documented in Patient Education Activity  This goal is used for patients with PCA  Outcome: Outcome Not Met, Continue to Monitor  Goal: Maximum comfort achieved/maintained at end of life (Hospice)  Outcome: Outcome Not Met, Continue to Monitor     Problem: Diabetes  Goal: Achieves glycemic balance  Description: Goal is to maintain blood sugar within range with no episodes of hypoglycemia  Outcome: Outcome Not Met, Continue to Monitor  Goal: Verbalizes/demonstrates understanding of NEW diagnosis of diabetes and management  Description: Document on Patient Education Activity  Outcome: Outcome Not Met, Continue to  Monitor  Goal: Verbalizes understanding of diabetes management including how to use HbA1C to evaluate status of blood sugar over time (Diabetes is NOT a new diagnosis)  Description: Diabetes Education  Outcome: Outcome Not Met, Continue to Monitor  Goal: Demonstrates ability to self-administer insulin  Description: Document on Patient Education Activity  Outcome: Outcome Not Met, Continue to Monitor      Standing/Walking

## 2023-09-04 RX ORDER — DONEPEZIL HYDROCHLORIDE 10 MG/1
10 TABLET ORAL
Qty: 90 | Refills: 1 | Status: ACTIVE | COMMUNITY
Start: 2022-10-11 | End: 1900-01-01

## 2023-09-05 ENCOUNTER — NON-APPOINTMENT (OUTPATIENT)
Age: 88
End: 2023-09-05

## 2023-09-05 ENCOUNTER — RX RENEWAL (OUTPATIENT)
Age: 88
End: 2023-09-05

## 2023-09-05 RX ORDER — ESCITALOPRAM OXALATE 10 MG/1
10 TABLET ORAL
Qty: 90 | Refills: 1 | Status: ACTIVE | COMMUNITY
Start: 2022-11-18 | End: 1900-01-01

## 2023-09-05 RX ORDER — DIAZEPAM 5 MG/1
5 TABLET ORAL
Qty: 30 | Refills: 0 | Status: ACTIVE | COMMUNITY
Start: 2023-08-09

## 2023-09-15 ENCOUNTER — APPOINTMENT (OUTPATIENT)
Dept: NEUROLOGY | Facility: CLINIC | Age: 88
End: 2023-09-15

## 2023-10-22 RX ORDER — SIMVASTATIN 10 MG/1
10 TABLET, FILM COATED ORAL
Qty: 90 | Refills: 0 | Status: ACTIVE | COMMUNITY
Start: 2017-06-17 | End: 1900-01-01

## 2023-10-30 ENCOUNTER — APPOINTMENT (OUTPATIENT)
Dept: INTERNAL MEDICINE | Facility: CLINIC | Age: 88
End: 2023-10-30

## 2023-11-03 ENCOUNTER — NON-APPOINTMENT (OUTPATIENT)
Age: 88
End: 2023-11-03

## 2023-11-03 DIAGNOSIS — D48.5 NEOPLASM OF UNCERTAIN BEHAVIOR OF SKIN: ICD-10-CM

## 2023-11-06 PROBLEM — D48.5 NEOPLASM OF UNCERTAIN BEHAVIOR OF SKIN: Status: ACTIVE | Noted: 2019-12-17

## 2023-11-15 LAB — CORE LAB BIOPSY: NORMAL

## 2024-01-12 ENCOUNTER — APPOINTMENT (OUTPATIENT)
Dept: RHEUMATOLOGY | Facility: CLINIC | Age: 89
End: 2024-01-12

## 2024-02-22 RX ORDER — FLUOROURACIL 50 MG/G
5 CREAM TOPICAL
Qty: 1 | Refills: 1 | Status: ACTIVE | COMMUNITY
Start: 2024-02-22 | End: 1900-01-01
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
